# Patient Record
Sex: MALE | Race: WHITE | NOT HISPANIC OR LATINO | URBAN - METROPOLITAN AREA
[De-identification: names, ages, dates, MRNs, and addresses within clinical notes are randomized per-mention and may not be internally consistent; named-entity substitution may affect disease eponyms.]

---

## 2022-05-10 ENCOUNTER — EMERGENCY (EMERGENCY)
Facility: HOSPITAL | Age: 47
LOS: 0 days | Discharge: LEFT BEFORE DISCHARGE | End: 2022-05-10
Attending: EMERGENCY MEDICINE | Admitting: EMERGENCY MEDICINE
Payer: COMMERCIAL

## 2022-05-10 VITALS
WEIGHT: 175.05 LBS | TEMPERATURE: 99 F | RESPIRATION RATE: 16 BRPM | HEART RATE: 106 BPM | DIASTOLIC BLOOD PRESSURE: 99 MMHG | OXYGEN SATURATION: 100 % | SYSTOLIC BLOOD PRESSURE: 175 MMHG
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
## 2022-05-10 DIAGNOSIS — Z53.29 PROCEDURE AND TREATMENT NOT CARRIED OUT BECAUSE OF PATIENT'S DECISION FOR OTHER REASONS: ICD-10-CM

## 2022-05-10 DIAGNOSIS — M54.50 LOW BACK PAIN, UNSPECIFIED: ICD-10-CM

## 2022-05-10 DIAGNOSIS — F17.200 NICOTINE DEPENDENCE, UNSPECIFIED, UNCOMPLICATED: ICD-10-CM
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
## 2022-05-10 PROCEDURE — 99284 EMERGENCY DEPT VISIT MOD MDM: CPT

## 2022-05-10 RX ORDER — KETOROLAC TROMETHAMINE 30 MG/ML
30 SYRINGE (ML) INJECTION ONCE
Refills: 0 | Status: DISCONTINUED | OUTPATIENT
Start: 2022-05-10 | End: 2022-05-10

## 2022-05-10 RX ORDER — METHOCARBAMOL 500 MG/1
2 TABLET, FILM COATED ORAL
Qty: 18 | Refills: 0
Start: 2022-05-10 | End: 2022-05-12

## 2022-05-10 RX ORDER — LIDOCAINE 4 G/100G
1 CREAM TOPICAL
Qty: 3 | Refills: 0
Start: 2022-05-10 | End: 2022-05-12

## 2022-05-10 RX ADMIN — Medication 30 MILLIGRAM(S): at 18:21

## 2022-05-10 NOTE — ED PROVIDER NOTE - PHYSICAL EXAMINATION
Vital Signs: I have reviewed the initial vital signs.  Constitutional: well-nourished, appears stated age, no acute distress  Cardiovascular: regular rate, regular rhythm, well-perfused extremities  Respiratory: unlabored respiratory effort, clear to auscultation bilaterally  Gastrointestinal: soft, non-tender abdomen  Musculoskeletal: supple neck, no lower extremity edema, no midline or paraspinal cervical, thoracic, or lumbar tenderness; ambulating normally   Integumentary: warm, dry, no rash  Neurologic: awake, alert, extremities’ motor and sensory functions grossly intact  Psychiatric: appropriate mood, appropriate affect

## 2022-05-10 NOTE — ED PROVIDER NOTE - OBJECTIVE STATEMENT
The pt is a 46y M w/ no PMH presenting with lumbar back pain x4 days. Pt states on Friday he was getting dressed, and when he was bent pulling his pants up he felt sudden R lumbar back pain. In the proceeding days, pain persisted despite Advil and occasional Aleve. Today, he also feels pain in the L lumbar region. No fever, bladder/bowel complaints, LE numbness. Denies headache, chest pain, SOB, N/V, abdominal pain.

## 2025-05-19 ENCOUNTER — EMERGENCY (EMERGENCY)
Facility: HOSPITAL | Age: 50
LOS: 0 days | Discharge: HOME | End: 2025-05-19
Attending: EMERGENCY MEDICINE
Payer: COMMERCIAL

## 2025-05-19 VITALS
OXYGEN SATURATION: 99 % | DIASTOLIC BLOOD PRESSURE: 90 MMHG | RESPIRATION RATE: 16 BRPM | HEART RATE: 80 BPM | SYSTOLIC BLOOD PRESSURE: 162 MMHG

## 2025-05-19 VITALS
WEIGHT: 169.98 LBS | DIASTOLIC BLOOD PRESSURE: 98 MMHG | HEART RATE: 90 BPM | RESPIRATION RATE: 20 BRPM | SYSTOLIC BLOOD PRESSURE: 165 MMHG | TEMPERATURE: 99 F | HEIGHT: 70 IN | OXYGEN SATURATION: 99 %

## 2025-05-19 DIAGNOSIS — N20.2 CALCULUS OF KIDNEY WITH CALCULUS OF URETER: ICD-10-CM

## 2025-05-19 LAB
ALBUMIN SERPL ELPH-MCNC: 5 G/DL — SIGNIFICANT CHANGE UP (ref 3.5–5.2)
ALP SERPL-CCNC: 69 U/L — SIGNIFICANT CHANGE UP (ref 30–115)
ALT FLD-CCNC: 18 U/L — SIGNIFICANT CHANGE UP (ref 0–41)
ANION GAP SERPL CALC-SCNC: 15 MMOL/L — HIGH (ref 7–14)
AST SERPL-CCNC: 20 U/L — SIGNIFICANT CHANGE UP (ref 0–41)
BASOPHILS # BLD AUTO: 0.05 K/UL — SIGNIFICANT CHANGE UP (ref 0–0.2)
BASOPHILS NFR BLD AUTO: 1 % — SIGNIFICANT CHANGE UP (ref 0–1)
BILIRUB SERPL-MCNC: 0.3 MG/DL — SIGNIFICANT CHANGE UP (ref 0.2–1.2)
BUN SERPL-MCNC: 13 MG/DL — SIGNIFICANT CHANGE UP (ref 10–20)
CALCIUM SERPL-MCNC: 10 MG/DL — SIGNIFICANT CHANGE UP (ref 8.4–10.5)
CHLORIDE SERPL-SCNC: 103 MMOL/L — SIGNIFICANT CHANGE UP (ref 98–110)
CO2 SERPL-SCNC: 26 MMOL/L — SIGNIFICANT CHANGE UP (ref 17–32)
CREAT SERPL-MCNC: 0.9 MG/DL — SIGNIFICANT CHANGE UP (ref 0.7–1.5)
EGFR: 106 ML/MIN/1.73M2 — SIGNIFICANT CHANGE UP
EGFR: 106 ML/MIN/1.73M2 — SIGNIFICANT CHANGE UP
EOSINOPHIL # BLD AUTO: 0.24 K/UL — SIGNIFICANT CHANGE UP (ref 0–0.7)
EOSINOPHIL NFR BLD AUTO: 5 % — SIGNIFICANT CHANGE UP (ref 0–8)
GLUCOSE SERPL-MCNC: 103 MG/DL — HIGH (ref 70–99)
HCT VFR BLD CALC: 41.4 % — LOW (ref 42–52)
HGB BLD-MCNC: 14.5 G/DL — SIGNIFICANT CHANGE UP (ref 14–18)
IMM GRANULOCYTES NFR BLD AUTO: 0.2 % — SIGNIFICANT CHANGE UP (ref 0.1–0.3)
LIDOCAIN IGE QN: 26 U/L — SIGNIFICANT CHANGE UP (ref 7–60)
LYMPHOCYTES # BLD AUTO: 1.38 K/UL — SIGNIFICANT CHANGE UP (ref 1.2–3.4)
LYMPHOCYTES # BLD AUTO: 28.8 % — SIGNIFICANT CHANGE UP (ref 20.5–51.1)
MCHC RBC-ENTMCNC: 33.7 PG — HIGH (ref 27–31)
MCHC RBC-ENTMCNC: 35 G/DL — SIGNIFICANT CHANGE UP (ref 32–37)
MCV RBC AUTO: 96.3 FL — HIGH (ref 80–94)
MONOCYTES # BLD AUTO: 0.4 K/UL — SIGNIFICANT CHANGE UP (ref 0.1–0.6)
MONOCYTES NFR BLD AUTO: 8.4 % — SIGNIFICANT CHANGE UP (ref 1.7–9.3)
NEUTROPHILS # BLD AUTO: 2.71 K/UL — SIGNIFICANT CHANGE UP (ref 1.4–6.5)
NEUTROPHILS NFR BLD AUTO: 56.6 % — SIGNIFICANT CHANGE UP (ref 42.2–75.2)
NRBC BLD AUTO-RTO: 0 /100 WBCS — SIGNIFICANT CHANGE UP (ref 0–0)
PLATELET # BLD AUTO: 213 K/UL — SIGNIFICANT CHANGE UP (ref 130–400)
PMV BLD: 9.4 FL — SIGNIFICANT CHANGE UP (ref 7.4–10.4)
POTASSIUM SERPL-MCNC: 4.3 MMOL/L — SIGNIFICANT CHANGE UP (ref 3.5–5)
POTASSIUM SERPL-SCNC: 4.3 MMOL/L — SIGNIFICANT CHANGE UP (ref 3.5–5)
PROT SERPL-MCNC: 7.7 G/DL — SIGNIFICANT CHANGE UP (ref 6–8)
RBC # BLD: 4.3 M/UL — LOW (ref 4.7–6.1)
RBC # FLD: 11.8 % — SIGNIFICANT CHANGE UP (ref 11.5–14.5)
SODIUM SERPL-SCNC: 144 MMOL/L — SIGNIFICANT CHANGE UP (ref 135–146)
WBC # BLD: 4.79 K/UL — LOW (ref 4.8–10.8)
WBC # FLD AUTO: 4.79 K/UL — LOW (ref 4.8–10.8)

## 2025-05-19 PROCEDURE — 74177 CT ABD & PELVIS W/CONTRAST: CPT

## 2025-05-19 PROCEDURE — 36415 COLL VENOUS BLD VENIPUNCTURE: CPT

## 2025-05-19 PROCEDURE — 80053 COMPREHEN METABOLIC PANEL: CPT

## 2025-05-19 PROCEDURE — 96375 TX/PRO/DX INJ NEW DRUG ADDON: CPT

## 2025-05-19 PROCEDURE — 74177 CT ABD & PELVIS W/CONTRAST: CPT | Mod: 26

## 2025-05-19 PROCEDURE — 83690 ASSAY OF LIPASE: CPT

## 2025-05-19 PROCEDURE — 96374 THER/PROPH/DIAG INJ IV PUSH: CPT | Mod: XU

## 2025-05-19 PROCEDURE — 99285 EMERGENCY DEPT VISIT HI MDM: CPT

## 2025-05-19 PROCEDURE — 96376 TX/PRO/DX INJ SAME DRUG ADON: CPT

## 2025-05-19 PROCEDURE — 99284 EMERGENCY DEPT VISIT MOD MDM: CPT | Mod: 25

## 2025-05-19 PROCEDURE — 85025 COMPLETE CBC W/AUTO DIFF WBC: CPT

## 2025-05-19 RX ORDER — KETOROLAC TROMETHAMINE 30 MG/ML
15 INJECTION, SOLUTION INTRAMUSCULAR; INTRAVENOUS ONCE
Refills: 0 | Status: DISCONTINUED | OUTPATIENT
Start: 2025-05-19 | End: 2025-05-19

## 2025-05-19 RX ORDER — OXYCODONE HYDROCHLORIDE AND ACETAMINOPHEN 10; 325 MG/1; MG/1
1 TABLET ORAL
Qty: 12 | Refills: 0
Start: 2025-05-19

## 2025-05-19 RX ORDER — NAPROXEN SODIUM 275 MG
1 TABLET ORAL
Qty: 14 | Refills: 0
Start: 2025-05-19

## 2025-05-19 RX ORDER — ONDANSETRON HCL/PF 4 MG/2 ML
4 VIAL (ML) INJECTION ONCE
Refills: 0 | Status: COMPLETED | OUTPATIENT
Start: 2025-05-19 | End: 2025-05-19

## 2025-05-19 RX ORDER — TAMSULOSIN HYDROCHLORIDE 0.4 MG/1
1 CAPSULE ORAL
Qty: 5 | Refills: 0
Start: 2025-05-19 | End: 2025-05-23

## 2025-05-19 RX ADMIN — Medication 1000 MILLILITER(S): at 09:37

## 2025-05-19 RX ADMIN — Medication 4 MILLIGRAM(S): at 09:36

## 2025-05-19 RX ADMIN — KETOROLAC TROMETHAMINE 15 MILLIGRAM(S): 30 INJECTION, SOLUTION INTRAMUSCULAR; INTRAVENOUS at 09:35

## 2025-05-19 RX ADMIN — Medication 4 MILLIGRAM(S): at 10:07

## 2025-05-19 RX ADMIN — KETOROLAC TROMETHAMINE 15 MILLIGRAM(S): 30 INJECTION, SOLUTION INTRAMUSCULAR; INTRAVENOUS at 12:38

## 2025-05-19 NOTE — ED PROVIDER NOTE - OBJECTIVE STATEMENT
49-year-old male no significant past medical history presents today for abdominal pain for the last 3 days.  Patient is been having right-sided pain history of kidney stones history of hematuria no fevers chills

## 2025-05-19 NOTE — ED PROVIDER NOTE - PATIENT PORTAL LINK FT
You can access the FollowMyHealth Patient Portal offered by [facility] by registering at the following website: [email or web address]. By joining [facility]’s FollowMyHealth portal, you will also be able to view your health information using other applications (apps) compatible with our system.

## 2025-05-19 NOTE — ED PROVIDER NOTE - ATTENDING CONTRIBUTION TO CARE
Patient has a history of a previous kidney stone.  He complains of right flank pain.  He denies fever, trauma, rash.  He denies dysuria, frequency, urgency.  He denies hematuria.  Vital signs noted.  Patient in distress.  Chest clear.  Heart regular rate no murmur.  Abdomen nontender.  No CVA tenderness.  Extremities equal pulses.  Patient required morphine 2 control his pain.    Diagnostic testing reviewed.  WBCs within normal limits.  Chemistry shows no metabolic derangement.  CT confirms mid ureteral stone.  This is the likely etiology of the patient's symptoms.  Patient provided with analgesia including Toradol.  Patient asymptomatic after ED treatment.  In my opinion, outpatient follow-up and treatment are medically appropriate.  Analgesia provided at discharge.  Strict return precautions discussed.

## 2025-05-19 NOTE — ED PROVIDER NOTE - NSFOLLOWUPINSTRUCTIONS_ED_ALL_ED_FT
Our Emergency Department Referral Coordinators will be reaching out to you in the next 24-48 hours from 9:00am to 5:00pm to schedule a follow up appointment. Please expect a phone call from the hospital in that time frame. If you do not receive a call or if you have any questions or concerns, you can reach them at   [phone].      Kidney Stones    WHAT YOU NEED TO KNOW:    What is a kidney stone? Kidney stones form in the urinary system when the water and waste in your urine are out of balance. When this happens, certain types of waste crystals separate from the urine. The crystals build up and form kidney stones. Kidney stones can be made of uric acid, calcium, phosphate, or oxalate crystals. You may have more than one kidney stone.  Kidney Stones     What increases my risk for kidney stones?    Not drinking enough liquids (especially water) each day    Having urinary tract infections often    Too much of certain foods, such as meat, salt, nuts, and chocolate    Obesity    Certain medicines, such as diuretics, steroids, and antacids    A family history of kidney stones    Being born with a kidney or bowel disorder  What are the signs and symptoms of kidney stones?    Pain in the middle of your back that moves across to your side or that may spread to your groin    Nausea and vomiting    Urge to urinate often, burning feeling when you urinate, or pink or red urine    Tenderness in your lower back, side, or stomach  How are kidney stones diagnosed? Your healthcare provider will ask about your health and usual foods. He or she may refer you to a urologist. You may need tests to find out what type of kidney stones you have. Tests can show the size of your kidney stones and where they are in your urinary system. You may need more than one of the following:    Urine tests may show if you have blood in your urine. They may also show high amounts of the substances that form kidney stones, such as uric acid.    Blood tests show how well your kidneys are working. They may also be used to check the levels of calcium or uric acid in your blood.    X-ray or ultrasound pictures may be taken of your kidneys, bladder, and ureters. You may be given contrast liquid before an x-ray to help these show up better in the pictures. You may need to have more than one x-ray. Tell the healthcare provider if you have ever had an allergic reaction to contrast liquid.  How are kidney stones treated?    Medicines may be used to prevent or relieve pain or to balance your electrolytes.    A procedure or surgery to remove the kidney stones may be needed if they do not pass on their own. Your treatment will depend on the size and location of your kidney stones.  What can I do to manage kidney stones?    Drink more liquids. Your healthcare provider may tell you to drink at least 8 to 12 (eight-ounce) cups of liquids each day. This helps flush out the kidney stones when you urinate. Water is the best liquid to drink.    Strain your urine every time you go to the bathroom. Urinate through a strainer or a piece of thin cloth to catch the stones. Take the stones to your healthcare provider so they can be sent to the lab for tests. This will help your healthcare providers plan the best treatment for you.  Look for Stones in the Filter      Ask if you should avoid any foods. You may need to limit oxalate. Oxalate is a chemical found in some plant foods. The most common type of kidney stone is made up of crystals that contain calcium and oxalate. Your healthcare provider or dietitian may recommend that you limit oxalate if you get this type of kidney stone often. You may need to limit how much sodium (salt) or protein you eat. Ask for information about the best foods for you.  High Oxalate Foods      Be physically active as directed. Your stones may pass more easily if you stay active. Physical activity can also help you manage your weight. Ask about the best activities for you.   Family Walking for Exercise  When should I seek immediate care?    You are vomiting and it is not relieved with medicine.    When should I call my doctor?    You have a fever.    You have trouble urinating.    You see blood in your urine.    You have severe pain.    You have any questions or concerns about your condition or care.  CARE AGREEMENT:    You have the right to help plan your care. Learn about your health condition and how it may be treated. Discuss treatment options with your healthcare providers to decide what care you want to receive. You always have the right to refuse treatment.

## 2025-05-19 NOTE — ED PROVIDER NOTE - DIFFERENTIAL DIAGNOSIS
Differential Diagnosis Renal colic, musculoskeletal back pain, diverticulitis, colitis, vascular emergencies such as AAA.

## 2025-05-19 NOTE — ED PROVIDER NOTE - PHYSICAL EXAMINATION
CONSTITUTIONAL: Well-developed; Uncomfrotable apearing   SKIN: warm, dry  HEAD: Normocephalic; atraumatic.  EYES: PERRL, EOMI, no conjunctival erythema  ENT: No nasal discharge; airway clear.  NECK: Supple; non tender.  CARD:  Regular rate and rhythm.   RESP: No wheezes, rales or rhonchi.  ABD: soft ntnd  EXT: Normal ROM.  No clubbing, cyanosis or edema.   Back: right flank pain

## 2025-05-21 NOTE — CHART NOTE - NSCHARTNOTEFT_GEN_A_CORE
[facility] N [id number] / Left message 5/20 & 5/21 - GUY    SPECIALTY: urology

## 2025-06-11 ENCOUNTER — INPATIENT (INPATIENT)
Facility: HOSPITAL | Age: 50
LOS: 1 days | Discharge: HOME | DRG: 379 | End: 2025-06-13
Attending: STUDENT IN AN ORGANIZED HEALTH CARE EDUCATION/TRAINING PROGRAM | Admitting: FAMILY MEDICINE
Payer: COMMERCIAL

## 2025-06-11 VITALS
RESPIRATION RATE: 18 BRPM | OXYGEN SATURATION: 99 % | DIASTOLIC BLOOD PRESSURE: 83 MMHG | HEART RATE: 116 BPM | TEMPERATURE: 98 F | HEIGHT: 70 IN | SYSTOLIC BLOOD PRESSURE: 152 MMHG | WEIGHT: 160.06 LBS

## 2025-06-11 LAB
BASOPHILS # BLD AUTO: 0.04 K/UL — SIGNIFICANT CHANGE UP (ref 0–0.2)
BASOPHILS NFR BLD AUTO: 0.5 % — SIGNIFICANT CHANGE UP (ref 0–1)
EOSINOPHIL # BLD AUTO: 0.29 K/UL — SIGNIFICANT CHANGE UP (ref 0–0.7)
EOSINOPHIL NFR BLD AUTO: 3.9 % — SIGNIFICANT CHANGE UP (ref 0–8)
HCT VFR BLD CALC: 29 % — LOW (ref 42–52)
HGB BLD-MCNC: 9.9 G/DL — LOW (ref 14–18)
IMM GRANULOCYTES NFR BLD AUTO: 0.3 % — SIGNIFICANT CHANGE UP (ref 0.1–0.3)
LYMPHOCYTES # BLD AUTO: 1.88 K/UL — SIGNIFICANT CHANGE UP (ref 1.2–3.4)
LYMPHOCYTES # BLD AUTO: 25.2 % — SIGNIFICANT CHANGE UP (ref 20.5–51.1)
MCHC RBC-ENTMCNC: 33.4 PG — HIGH (ref 27–31)
MCHC RBC-ENTMCNC: 34.1 G/DL — SIGNIFICANT CHANGE UP (ref 32–37)
MCV RBC AUTO: 98 FL — HIGH (ref 80–94)
MONOCYTES # BLD AUTO: 0.55 K/UL — SIGNIFICANT CHANGE UP (ref 0.1–0.6)
MONOCYTES NFR BLD AUTO: 7.4 % — SIGNIFICANT CHANGE UP (ref 1.7–9.3)
NEUTROPHILS # BLD AUTO: 4.68 K/UL — SIGNIFICANT CHANGE UP (ref 1.4–6.5)
NEUTROPHILS NFR BLD AUTO: 62.7 % — SIGNIFICANT CHANGE UP (ref 42.2–75.2)
NRBC BLD AUTO-RTO: 0 /100 WBCS — SIGNIFICANT CHANGE UP (ref 0–0)
PLATELET # BLD AUTO: 238 K/UL — SIGNIFICANT CHANGE UP (ref 130–400)
PMV BLD: 8.9 FL — SIGNIFICANT CHANGE UP (ref 7.4–10.4)
RBC # BLD: 2.96 M/UL — LOW (ref 4.7–6.1)
RBC # FLD: 11.8 % — SIGNIFICANT CHANGE UP (ref 11.5–14.5)
WBC # BLD: 7.46 K/UL — SIGNIFICANT CHANGE UP (ref 4.8–10.8)
WBC # FLD AUTO: 7.46 K/UL — SIGNIFICANT CHANGE UP (ref 4.8–10.8)

## 2025-06-11 PROCEDURE — 99285 EMERGENCY DEPT VISIT HI MDM: CPT

## 2025-06-11 RX ORDER — IOHEXOL 350 MG/ML
30 INJECTION, SOLUTION INTRAVENOUS ONCE
Refills: 0 | Status: DISCONTINUED | OUTPATIENT
Start: 2025-06-11 | End: 2025-06-11

## 2025-06-11 RX ADMIN — Medication 1000 MILLILITER(S): at 23:53

## 2025-06-11 RX ADMIN — Medication 20 MILLIGRAM(S): at 23:52

## 2025-06-12 DIAGNOSIS — K92.2 GASTROINTESTINAL HEMORRHAGE, UNSPECIFIED: ICD-10-CM

## 2025-06-12 LAB
ALBUMIN SERPL ELPH-MCNC: 4.2 G/DL — SIGNIFICANT CHANGE UP (ref 3.5–5.2)
ALP SERPL-CCNC: 80 U/L — SIGNIFICANT CHANGE UP (ref 30–115)
ALT FLD-CCNC: 11 U/L — SIGNIFICANT CHANGE UP (ref 0–41)
ANION GAP SERPL CALC-SCNC: 10 MMOL/L — SIGNIFICANT CHANGE UP (ref 7–14)
ANION GAP SERPL CALC-SCNC: 14 MMOL/L — SIGNIFICANT CHANGE UP (ref 7–14)
APPEARANCE UR: CLEAR — SIGNIFICANT CHANGE UP
AST SERPL-CCNC: 16 U/L — SIGNIFICANT CHANGE UP (ref 0–41)
BILIRUB SERPL-MCNC: <0.2 MG/DL — SIGNIFICANT CHANGE UP (ref 0.2–1.2)
BILIRUB UR-MCNC: NEGATIVE — SIGNIFICANT CHANGE UP
BLD GP AB SCN SERPL QL: SIGNIFICANT CHANGE UP
BUN SERPL-MCNC: 12 MG/DL — SIGNIFICANT CHANGE UP (ref 10–20)
BUN SERPL-MCNC: 18 MG/DL — SIGNIFICANT CHANGE UP (ref 10–20)
CALCIUM SERPL-MCNC: 8.8 MG/DL — SIGNIFICANT CHANGE UP (ref 8.4–10.5)
CALCIUM SERPL-MCNC: 9 MG/DL — SIGNIFICANT CHANGE UP (ref 8.4–10.5)
CHLORIDE SERPL-SCNC: 102 MMOL/L — SIGNIFICANT CHANGE UP (ref 98–110)
CHLORIDE SERPL-SCNC: 102 MMOL/L — SIGNIFICANT CHANGE UP (ref 98–110)
CO2 SERPL-SCNC: 22 MMOL/L — SIGNIFICANT CHANGE UP (ref 17–32)
CO2 SERPL-SCNC: 26 MMOL/L — SIGNIFICANT CHANGE UP (ref 17–32)
COLOR SPEC: YELLOW — SIGNIFICANT CHANGE UP
CREAT SERPL-MCNC: 0.8 MG/DL — SIGNIFICANT CHANGE UP (ref 0.7–1.5)
CREAT SERPL-MCNC: 0.9 MG/DL — SIGNIFICANT CHANGE UP (ref 0.7–1.5)
DIFF PNL FLD: NEGATIVE — SIGNIFICANT CHANGE UP
EGFR: 106 ML/MIN/1.73M2 — SIGNIFICANT CHANGE UP
EGFR: 106 ML/MIN/1.73M2 — SIGNIFICANT CHANGE UP
EGFR: 110 ML/MIN/1.73M2 — SIGNIFICANT CHANGE UP
EGFR: 110 ML/MIN/1.73M2 — SIGNIFICANT CHANGE UP
GLUCOSE SERPL-MCNC: 85 MG/DL — SIGNIFICANT CHANGE UP (ref 70–99)
GLUCOSE SERPL-MCNC: 98 MG/DL — SIGNIFICANT CHANGE UP (ref 70–99)
GLUCOSE UR QL: NEGATIVE MG/DL — SIGNIFICANT CHANGE UP
HCT VFR BLD CALC: 25.9 % — LOW (ref 42–52)
HCT VFR BLD CALC: 26 % — LOW (ref 42–52)
HCT VFR BLD CALC: 26.9 % — LOW (ref 42–52)
HCT VFR BLD CALC: 27.5 % — LOW (ref 42–52)
HCT VFR BLD CALC: 27.6 % — LOW (ref 42–52)
HGB BLD-MCNC: 8.8 G/DL — LOW (ref 14–18)
HGB BLD-MCNC: 9 G/DL — LOW (ref 14–18)
HGB BLD-MCNC: 9.2 G/DL — LOW (ref 14–18)
HGB BLD-MCNC: 9.3 G/DL — LOW (ref 14–18)
HGB BLD-MCNC: 9.5 G/DL — LOW (ref 14–18)
KETONES UR QL: ABNORMAL MG/DL
LEUKOCYTE ESTERASE UR-ACNC: ABNORMAL
LIDOCAIN IGE QN: 32 U/L — SIGNIFICANT CHANGE UP (ref 7–60)
MCHC RBC-ENTMCNC: 33.3 PG — HIGH (ref 27–31)
MCHC RBC-ENTMCNC: 33.3 PG — HIGH (ref 27–31)
MCHC RBC-ENTMCNC: 33.7 PG — HIGH (ref 27–31)
MCHC RBC-ENTMCNC: 33.8 G/DL — SIGNIFICANT CHANGE UP (ref 32–37)
MCHC RBC-ENTMCNC: 33.8 G/DL — SIGNIFICANT CHANGE UP (ref 32–37)
MCHC RBC-ENTMCNC: 33.8 PG — HIGH (ref 27–31)
MCHC RBC-ENTMCNC: 34 PG — HIGH (ref 27–31)
MCHC RBC-ENTMCNC: 34.2 G/DL — SIGNIFICANT CHANGE UP (ref 32–37)
MCHC RBC-ENTMCNC: 34.4 G/DL — SIGNIFICANT CHANGE UP (ref 32–37)
MCHC RBC-ENTMCNC: 34.7 G/DL — SIGNIFICANT CHANGE UP (ref 32–37)
MCV RBC AUTO: 97.7 FL — HIGH (ref 80–94)
MCV RBC AUTO: 98.2 FL — HIGH (ref 80–94)
MCV RBC AUTO: 98.5 FL — HIGH (ref 80–94)
MCV RBC AUTO: 98.5 FL — HIGH (ref 80–94)
MCV RBC AUTO: 98.6 FL — HIGH (ref 80–94)
NITRITE UR-MCNC: NEGATIVE — SIGNIFICANT CHANGE UP
NRBC BLD AUTO-RTO: 0 /100 WBCS — SIGNIFICANT CHANGE UP (ref 0–0)
PH UR: 6.5 — SIGNIFICANT CHANGE UP (ref 5–8)
PLATELET # BLD AUTO: 195 K/UL — SIGNIFICANT CHANGE UP (ref 130–400)
PLATELET # BLD AUTO: 205 K/UL — SIGNIFICANT CHANGE UP (ref 130–400)
PLATELET # BLD AUTO: 209 K/UL — SIGNIFICANT CHANGE UP (ref 130–400)
PLATELET # BLD AUTO: 210 K/UL — SIGNIFICANT CHANGE UP (ref 130–400)
PLATELET # BLD AUTO: 236 K/UL — SIGNIFICANT CHANGE UP (ref 130–400)
PMV BLD: 8.9 FL — SIGNIFICANT CHANGE UP (ref 7.4–10.4)
PMV BLD: 9.2 FL — SIGNIFICANT CHANGE UP (ref 7.4–10.4)
PMV BLD: 9.2 FL — SIGNIFICANT CHANGE UP (ref 7.4–10.4)
PMV BLD: 9.4 FL — SIGNIFICANT CHANGE UP (ref 7.4–10.4)
PMV BLD: 9.5 FL — SIGNIFICANT CHANGE UP (ref 7.4–10.4)
POTASSIUM SERPL-MCNC: 3.9 MMOL/L — SIGNIFICANT CHANGE UP (ref 3.5–5)
POTASSIUM SERPL-MCNC: 4.2 MMOL/L — SIGNIFICANT CHANGE UP (ref 3.5–5)
POTASSIUM SERPL-SCNC: 3.9 MMOL/L — SIGNIFICANT CHANGE UP (ref 3.5–5)
POTASSIUM SERPL-SCNC: 4.2 MMOL/L — SIGNIFICANT CHANGE UP (ref 3.5–5)
PROT SERPL-MCNC: 6.7 G/DL — SIGNIFICANT CHANGE UP (ref 6–8)
PROT UR-MCNC: NEGATIVE MG/DL — SIGNIFICANT CHANGE UP
RBC # BLD: 2.64 M/UL — LOW (ref 4.7–6.1)
RBC # BLD: 2.65 M/UL — LOW (ref 4.7–6.1)
RBC # BLD: 2.73 M/UL — LOW (ref 4.7–6.1)
RBC # BLD: 2.79 M/UL — LOW (ref 4.7–6.1)
RBC # BLD: 2.81 M/UL — LOW (ref 4.7–6.1)
RBC # FLD: 11.7 % — SIGNIFICANT CHANGE UP (ref 11.5–14.5)
RBC # FLD: 11.8 % — SIGNIFICANT CHANGE UP (ref 11.5–14.5)
RBC # FLD: 11.8 % — SIGNIFICANT CHANGE UP (ref 11.5–14.5)
RBC # FLD: 11.9 % — SIGNIFICANT CHANGE UP (ref 11.5–14.5)
RBC # FLD: 11.9 % — SIGNIFICANT CHANGE UP (ref 11.5–14.5)
SODIUM SERPL-SCNC: 138 MMOL/L — SIGNIFICANT CHANGE UP (ref 135–146)
SODIUM SERPL-SCNC: 138 MMOL/L — SIGNIFICANT CHANGE UP (ref 135–146)
SP GR SPEC: 1.01 — SIGNIFICANT CHANGE UP (ref 1–1.03)
UROBILINOGEN FLD QL: 0.2 MG/DL — SIGNIFICANT CHANGE UP (ref 0.2–1)
WBC # BLD: 6.12 K/UL — SIGNIFICANT CHANGE UP (ref 4.8–10.8)
WBC # BLD: 6.14 K/UL — SIGNIFICANT CHANGE UP (ref 4.8–10.8)
WBC # BLD: 6.45 K/UL — SIGNIFICANT CHANGE UP (ref 4.8–10.8)
WBC # BLD: 6.64 K/UL — SIGNIFICANT CHANGE UP (ref 4.8–10.8)
WBC # BLD: 6.7 K/UL — SIGNIFICANT CHANGE UP (ref 4.8–10.8)
WBC # FLD AUTO: 6.12 K/UL — SIGNIFICANT CHANGE UP (ref 4.8–10.8)
WBC # FLD AUTO: 6.14 K/UL — SIGNIFICANT CHANGE UP (ref 4.8–10.8)
WBC # FLD AUTO: 6.45 K/UL — SIGNIFICANT CHANGE UP (ref 4.8–10.8)
WBC # FLD AUTO: 6.64 K/UL — SIGNIFICANT CHANGE UP (ref 4.8–10.8)
WBC # FLD AUTO: 6.7 K/UL — SIGNIFICANT CHANGE UP (ref 4.8–10.8)

## 2025-06-12 PROCEDURE — 99223 1ST HOSP IP/OBS HIGH 75: CPT

## 2025-06-12 PROCEDURE — 88312 SPECIAL STAINS GROUP 1: CPT

## 2025-06-12 PROCEDURE — 99222 1ST HOSP IP/OBS MODERATE 55: CPT

## 2025-06-12 PROCEDURE — 74174 CTA ABD&PLVS W/CONTRAST: CPT | Mod: 26

## 2025-06-12 PROCEDURE — 80048 BASIC METABOLIC PNL TOTAL CA: CPT

## 2025-06-12 PROCEDURE — 85027 COMPLETE CBC AUTOMATED: CPT

## 2025-06-12 PROCEDURE — 81001 URINALYSIS AUTO W/SCOPE: CPT

## 2025-06-12 PROCEDURE — 88305 TISSUE EXAM BY PATHOLOGIST: CPT

## 2025-06-12 PROCEDURE — 36415 COLL VENOUS BLD VENIPUNCTURE: CPT

## 2025-06-12 RX ORDER — BISACODYL 5 MG
10 TABLET, DELAYED RELEASE (ENTERIC COATED) ORAL ONCE
Refills: 0 | Status: COMPLETED | OUTPATIENT
Start: 2025-06-12 | End: 2025-06-12

## 2025-06-12 RX ORDER — POLYETHYLENE GLYCOL-3350 AND ELECTROLYTES 236; 6.74; 5.86; 2.97; 22.74 G/274.31G; G/274.31G; G/274.31G; G/274.31G; G/274.31G
4000 POWDER, FOR SOLUTION ORAL ONCE
Refills: 0 | Status: COMPLETED | OUTPATIENT
Start: 2025-06-12 | End: 2025-06-12

## 2025-06-12 RX ORDER — ACETAMINOPHEN 500 MG/5ML
650 LIQUID (ML) ORAL EVERY 6 HOURS
Refills: 0 | Status: DISCONTINUED | OUTPATIENT
Start: 2025-06-12 | End: 2025-06-13

## 2025-06-12 RX ORDER — MAGNESIUM, ALUMINUM HYDROXIDE 200-200 MG
30 TABLET,CHEWABLE ORAL EVERY 4 HOURS
Refills: 0 | Status: DISCONTINUED | OUTPATIENT
Start: 2025-06-12 | End: 2025-06-13

## 2025-06-12 RX ORDER — ONDANSETRON HCL/PF 4 MG/2 ML
4 VIAL (ML) INJECTION EVERY 8 HOURS
Refills: 0 | Status: DISCONTINUED | OUTPATIENT
Start: 2025-06-12 | End: 2025-06-13

## 2025-06-12 RX ADMIN — Medication 40 MILLIGRAM(S): at 03:07

## 2025-06-12 RX ADMIN — POLYETHYLENE GLYCOL-3350 AND ELECTROLYTES 4000 MILLILITER(S): 236; 6.74; 5.86; 2.97; 22.74 POWDER, FOR SOLUTION ORAL at 19:01

## 2025-06-12 RX ADMIN — Medication 80 MILLILITER(S): at 20:05

## 2025-06-12 RX ADMIN — Medication 40 MILLIGRAM(S): at 18:59

## 2025-06-12 RX ADMIN — Medication 10 MILLIGRAM(S): at 19:00

## 2025-06-12 RX ADMIN — Medication 80 MILLILITER(S): at 11:41

## 2025-06-12 RX ADMIN — Medication 1 APPLICATION(S): at 11:44

## 2025-06-12 NOTE — PATIENT PROFILE ADULT - FALL HARM RISK - HARM RISK INTERVENTIONS

## 2025-06-12 NOTE — CONSULT NOTE ADULT - ASSESSMENT
This is a 49-year-old M with a PMHx of Diverticulosis, hx colon resection 2016, recently diagnosed right ureteral stone,  presenting with blood per rectum and abdominal pain    1. R/O GI bleed  2. Distal right ureteral stone w/minimal hydroureter and asymtomatic    Case DW Dr. Carrera:   - no acute  intervention  -continue to strain urine  - can start flomax  -trend labs, creatinine  -Please alert  service immediately if patient develops fever, intractable pain, change in vital signs - would need emergent stent   -please obtain clean catch UA (RN aware)  -will follow  Per GI: pt foe EGD/colonoscopy this admission

## 2025-06-12 NOTE — CONSULT NOTE ADULT - SUBJECTIVE AND OBJECTIVE BOX
This is a 49-year-old M with a PMHx of diverticulosis w/hx of colon resection in 2016, who presents with complaints of rectal bleeding and abdominal bloating/cramping x 3 days. He also states that three weeks ago, he visited the ER for right flank pain, and was diagnosed with a right mid ureteral stone --DC'd from ER at that time.  Patient followed-up with Dr. Torrez ([facility])  --  and referral to Urology (Dr. Norwood was scheduled for (06/18).   -- x2 nights ago, the patient reports to tenlonniemus followed by bright red blood per rectum. The patient is unsure if the blood is from the stool or urine.   -- x1 night ago, the patient mentioned developing abdominal cramping, blood per rectum, diaphoresis, dizziness, hematuria, and fever. He had the urge to urinate but was unsure if he had actually gone. Patient reports his last BM to be x1 day ago.   -- Today, per the patient, currently passing flatulence and small amounts of bright red blood with wiping.  He also denies right flank pain/current hematuria/dysuria; admits to occasional pain radiating to right scrotum (but none today);   Patient admits to smoking 1-2 cigarettes QD and drinks 10-12oz of wine/ day. Patient currently takes a multivitamin QD. NKDA. Patient denies currently, fever, hx of bleeding/ clotting d/o, rashes, exposure to sick contacts, or N/V.        ROS:  - GENERAL: + fever.  + diaphoresis. Denies weight loss  - CARDIO: denies chest pain, palpitations, edema.  - PULM: denies SOB, cough, wheeze  - GI: +abd cramping, + bright red blood per rectum. Denies appetite changes, N/V/  - : +hematuria, +flatulence, + bloating. Denies dysuria, frequency  - NEURO: + dizziness. Denies syncope, weakness, numbness or tingling.     PAST MEDICAL HX:  - Diverticulosis  - Kidney Stones    PAST SURGICAL HX:  - colon resection 2016    MEDICATIONS:  - Multivitamin QD    ALLERGIES:  - NKDA    FAMILY HX:  - non-contributory    SOCIAL HX:  - Current smoker: 1-2 cigarettes/ day  - No drug use  - Alcohol: wine, 10-12oz/ day  -    OBJECTIVE:    Vital Signs Last 24 Hrs  T(C): 36.8 (12 Jun 2025 05:01), Max: 36.8 (12 Jun 2025 03:52)  T(F): 98.2 (12 Jun 2025 05:01), Max: 98.2 (12 Jun 2025 03:52)  HR: 65 (12 Jun 2025 05:01) (65 - 116)  BP: 148/82 (12 Jun 2025 05:01) (147/96 - 152/83)  RR: 18 (12 Jun 2025 05:01) (18 - 18)  SpO2: 99% (12 Jun 2025 05:01) (99% - 100%)  --------------  Parameters below as of 12 Jun 2025 05:01  Patient On (Oxygen Delivery Method): room air      PHYSICAL EXAM:  - GENERAL: alert, awake, and oriented x3  - CARDIOVASCULAR: regular rate and rhythm. No murmurs, rubs, or gallops.  - RESPIRATORY: clear to auscultation bilaterally. No rales, wheezes, or rhonchi.   - GASTROINTESTINAL: soft, NT/ND  - GENITOURINARY: No CVA tenderness B/L. No suprapubic tenderness.    LABS:                      9.0    6.45  )-----------( 195      ( 12 Jun 2025 07:16 )             25.9     06-11    138  |  102  |  18  ----------------------------<  98  3.9   |  22  |  0.8    Ca    9.0      11 Jun 2025 23:50    TPro  6.7  /  Alb  4.2  /  TBili  <0.2  /  DBili  x   /  AST  16  /  ALT  11  /  AlkPhos  80  06-11      IMAGING  CT Angio Abdomen and Pelvis w/ IV Cont (06.12.25 @ 01:26) >  IMPRESSION:    No CTA evidence of acute GI bleed.    Calculi in the distal right ureter, largest measures 3 x 3 by 4 mm   (Hounsfield unit 1562). Minimal hydroureter.    Nonobstructing left lower pole renal calculus.

## 2025-06-12 NOTE — H&P ADULT - HISTORY OF PRESENT ILLNESS
49 year old male past medical history of diverticulosis with colon resction, renal stones comes to emergency room for GI bleed. patient states that since yesterday he has had 2 episodes per day of BRBPR. patient states that he has no chest pain, no shortness of breath. no weakness. no abd pain. 50 y/o M w/PMHx of diverticulosis with colon resection, R renal stones comes to emergency room for GI bleed. patient states that since 6/10 he has had 2 episodes per day of BRBPR. Denies ever having GIB or any blood transfusions. States he had a fall and injured his L flank region while cleaning up his yard for daughters graduation party and thinks perhaps GIB may be related to the the trauma. patient states that he has no chest pain, no shortness of breath. no weakness. no abd pain. Denies HA, dizziness.  50 y/o M w/PMHx of diverticulosis with colon resection, R renal stones comes to emergency room for GI bleed. patient states that since 6/10 he has had 2 episodes per day of BRBPR. Denies ever having GIB or any blood transfusions. States he had a fall and injured his L flank region while cleaning up his yard for daughters graduation party and thinks perhaps GIB may be related to the the trauma. patient states that he has no chest pain, no shortness of breath. no weakness. no abd pain N/v/D . Denies HA, dizziness.

## 2025-06-12 NOTE — CONSULT NOTE ADULT - ASSESSMENT
49yMale pmh episodes of diverticulitis s/p sigmoid resection 2016 had Colonoscopy with Dr. Queen prior and showed diverticulosis never had Colonoscopy since, right renal stones taking N-saids the past few weeks for pain presents for two days of BRBPR. Patient denies nausea, vomiting, hematemesis, melena,  diarrhea, constipation, abdominal pain. +blood in stool    #BRBPR, N-said use  History of Diverticulosis and sigmoid resection  ddx PUD, gastritis, diverticulosis  Rec  -plan for EGD/Colonoscopy tomorrow  -prep today clear liquids, 4L golytely 20mg dulcolax  -npo aftermidnight  -stop N-SAIDS  -Maintain Hemodynamic Stability   -Monitor CBC  -CMP,Optimize Electrolytes  -PT,PTT,INR  -EKG, Chest-Xray   -Transfuse prn to hgb >8  -Two large bore IV lines  -PPI BID  -Monitor Vital Signs  -Monitor Stool For blood, frequency, consistency, melena  -Active Type and Screen  -Iron Studies, Folate, Vitamin B12 levels   - Tobacco and Alcohol Cessation Strongly Advised and Encouraged     #Calculi in the distal right ureter, largest measures 3 x 3 by 4 mm   (Hounsfield unit 1562). Minimal hydroureter.  #Nonobstructing left lower pole renal calculus  Rec  - Care as per primary team and urology team  49yMale pmh episodes of diverticulitis s/p sigmoid resection 2016 had Colonoscopy with Dr. Queen prior and showed diverticulosis never had Colonoscopy since, right renal stones taking N-saids the past few weeks for pain presents for two days of BRBPR. Patient denies nausea, vomiting, hematemesis, melena,  diarrhea, constipation, abdominal pain. +blood in stool    #BRBPR, N-said use  History of Diverticulosis and sigmoid resection  ddx PUD, gastritis, diverticulosis  Rec  -plan for EGD/Colonoscopy tomorrow  -prep today clear liquids, 4L golytely 20mg dulcolax  -npo aftermidnight  -stop N-SAIDS  -Maintain Hemodynamic Stability   -Monitor CBC  -CMP,Optimize Electrolytes  -PT,PTT,INR  -EKG, Chest-Xray   -Transfuse prn to hgb >8  -Two large bore IV lines  -PPI BID  -Monitor Vital Signs  -Monitor Stool For blood, frequency, consistency, melena  -Active Type and Screen  -Iron Studies, Folate, Vitamin B12 levels   - Tobacco and Alcohol Cessation Strongly Advised and Encouraged     #Calculi in the distal right ureter, largest measures 3 x 3 by 4 mm   (Hounsfield unit 1562). Minimal hydroureter.  #Nonobstructing left lower pole renal calculus  Rec  - Care as per primary team and urology team    #bruising on left side of abdomen noted  -patient had fall two weeks ago  Rec  -appreciate CT  -primary team aware  49yMale pmh episodes of diverticulitis s/p sigmoid resection 2016 had Colonoscopy with Dr. Queen prior and showed diverticulosis never had Colonoscopy since, right renal stones taking N-saids the past few weeks for pain presents for two days of BRBPR. Patient denies nausea, vomiting, hematemesis, melena,  diarrhea, constipation, abdominal pain. +blood in stool    #BRBPR, N-said use  History of Diverticulosis and sigmoid resection  ddx PUD, gastritis, diverticulosis  Rec  -plan for EGD/Colonoscopy tomorrow, risks abd benefits discussed  -prep today clear liquids, 4L golytely 20mg dulcolax  -npo aftermidnight  -stop N-SAIDS  -Maintain Hemodynamic Stability   -Monitor CBC  -CMP,Optimize Electrolytes  -PT,PTT,INR  -EKG, Chest-Xray   -Transfuse prn to hgb >8  -Two large bore IV lines  -PPI BID  -Monitor Vital Signs  -Monitor Stool For blood, frequency, consistency, melena  -Active Type and Screen  -Iron Studies, Folate, Vitamin B12 levels   - Tobacco and Alcohol Cessation Strongly Advised and Encouraged     #Calculi in the distal right ureter, largest measures 3 x 3 by 4 mm   (Hounsfield unit 1562). Minimal hydroureter.  #Nonobstructing left lower pole renal calculus  Rec  - Care as per primary team and urology team    #bruising on left side of abdomen noted  -patient had fall two weeks ago  Rec  -appreciate CT  -primary team aware

## 2025-06-12 NOTE — PATIENT PROFILE ADULT - PUBLIC BENEFITS
Anesthesia Type: 1% lidocaine with 1:100,000 epinephrine and 408mcg clindamycin/ml and a 1:10 solution of 8.4% sodium bicarbonate no

## 2025-06-12 NOTE — MEDICAL STUDENT PROGRESS NOTE(EDUCATION) - ASSESSMENT
This is a 49-year-old M with a PMHx of Diverticulosis and Kidney Stones presenting with blood per rectum and abdominal pain  Labs were reviewed and patient was noted to have a anemia. Hemoglobin levels dropped from 14.5 --9.9 --8.8 --9.  BUN: Cr ratio was within the normal limit.     CT A/P scan was reviewed and patient noted to have a mild R hydroureter.   The largest stone measuring about 3x3x4 in the distal R ureter  L ureter is unobstructed and functioning at baseline. This is a 49-year-old M with a PMHx of Diverticulosis, hx colon resection 2016, recently diagnosed right ureteral stone,  presenting with blood per rectum and abdominal pain      CT A/P scan was reviewed and patient noted to have a mild R hydroureter.   The largest stone measuring about 3x3x4 in the distal R ureter  L ureter is unobstructed and functioning at baseline. show

## 2025-06-12 NOTE — ED PROVIDER NOTE - CLINICAL SUMMARY MEDICAL DECISION MAKING FREE TEXT BOX
49-year-old male past medical history of diverticulosis status post partial colectomy, presents for 2 episodes last night of bright red blood per rectum and 2 episodes this morning.  When patient told his sister who is a nurse later tonight came into ED for eval.  Patient not had any more episodes since this morning.Nontoxic well-appearing rectal Mar[place] stool hemoglobin 9 (previous 14) CTA abdomen no active bleeding.  Given large drop in hemoglobin patient admitted for GI eval and serial CBCs

## 2025-06-12 NOTE — CONSULT NOTE ADULT - SUBJECTIVE AND OBJECTIVE BOX
Chief complaint/Reason for consult: BRBPR    HPI:  48 y/o M w/PMHx of diverticulosis with colon resection, R renal stones comes to emergency room for GI bleed. patient states that since 6/10 he has had 2 episodes per day of BRBPR. Denies ever having GIB or any blood transfusions. States he had a fall and injured his L flank region while cleaning up his yard for daughters graduation party and thinks perhaps GIB may be related to the the trauma. patient states that he has no chest pain, no shortness of breath. no weakness. no abd pain N/v/D . Denies HA, dizziness.  (12 Jun 2025 05:16)    GI updates: 49yMale pmh episodes of diverticulitis s/p sigmoid resection 2016 had Colonoscopy with Dr. Queen prior and showed diverticulosis never had Colonoscopy since, right renal stones taking N-saids the past few weeks for pain presents for two days of BRBPR. Patient denies nausea, vomiting, hematemesis, melena,  diarrhea, constipation, abdominal pain. +blood in stool      PAST MEDICAL & SURGICAL HISTORY:   Renal stones  Diverticulosis  History of colon resection  GIB (gastrointestinal bleeding)      Family history:  FAMILY HISTORY:    No GI cancers in first or second degree relatives    Social History: +2-3 cigarettes daily smoking. +daily wine alcohol. No illegal drug use.    Allergies:   No Known Allergies  Intolerances      MEDICATIONS  (STANDING):  chlorhexidine 2% Cloths 1 Application(s) Topical <User Schedule>  pantoprazole  Injectable 40 milliGRAM(s) IV Push every 12 hours  sodium chloride 0.9%. 1000 milliLiter(s) (80 mL/Hr) IV Continuous <Continuous>    MEDICATIONS  (PRN):  acetaminophen     Tablet .. 650 milliGRAM(s) Oral every 6 hours PRN Temp greater or equal to 38C (100.4F), Mild Pain (1 - 3)  aluminum hydroxide/magnesium hydroxide/simethicone Suspension 30 milliLiter(s) Oral every 4 hours PRN Dyspepsia  ondansetron Injectable 4 milliGRAM(s) IV Push every 8 hours PRN Nausea and/or Vomiting        REVIEW OF SYSTEMS  General:  No weight loss, fevers, or chills.  Eyes:  No reported pain or visual changes  ENT:  No sore throat or runny nose.  NECK: No stiffness   CV:  No chest pain or palpitations.  Resp:  No shortness of breath, cough, wheezing or hemoptysis  GI:  No abdominal pain, nausea, vomiting, dysphagia, diarrhea or constipation. + rectal bleeding, no melena, or hematemesis.  Neuro:  No tingling, numbness       VITALS:   T(F): 98.2 (06-12-25 @ 05:01), Max: 98.2 (06-12-25 @ 03:52)  HR: 65 (06-12-25 @ 05:01) (65 - 116)  BP: 148/82 (06-12-25 @ 05:01) (147/96 - 152/83)  RR: 18 (06-12-25 @ 05:01) (18 - 18)  SpO2: 99% (06-12-25 @ 05:01) (99% - 100%)    PHYSICAL EXAM:  GENERAL: AAOx3, no acute distress.  HEAD:  Atraumatic, Normocephalic  EYES: conjunctiva and sclera clear  NECK: Supple, No thyromegaly   CHEST/LUNG: Clear to auscultation bilaterally; No wheeze, rhonchi, or rales  HEART: Regular rate and rhythm; normal S1, S2, No murmurs.  ABDOMEN: Soft, nontender, nondistended; Bowel sounds present  NEUROLOGY: No asterixis or tremor  SKIN: Intact, no jaundice  Rectal exam-trace red blood in rectal vault        LABS:  06-11    138  |  102  |  18  ----------------------------<  98  3.9   |  22  |  0.8    Ca    9.0      11 Jun 2025 23:50    TPro  6.7  /  Alb  4.2  /  TBili  <0.2  /  DBili  x   /  AST  16  /  ALT  11  /  AlkPhos  80  06-11                          9.0    6.45  )-----------( 195      ( 12 Jun 2025 07:16 )             25.9     LIVER FUNCTIONS - ( 11 Jun 2025 23:50 )  Alb: 4.2 g/dL / Pro: 6.7 g/dL / ALK PHOS: 80 U/L / ALT: 11 U/L / AST: 16 U/L / GGT: x               IMAGING:    < from: CT Angio Abdomen and Pelvis w/ IV Cont (06.12.25 @ 01:26) >    ACC: [id number] EXAM:  CT ANGIO ABD PELV (W)AW IC   ORDERED BY: NORMAN RODARTE     PROCEDURE DATE:  06/12/2025          INTERPRETATION:  STUDY INDICATION: GI bleed , bloody stools    TECHNIQUE: CT of the abdomen and pelvis obtained without intravenous   contrast. CT angiogram and subsequent CT venous phase of the abdomen and   pelvis was obtained following administration of intravenous contrast.   Oral contrast was not administered.  Reformatted images in the coronal   and sagittal planes wereacquired.    COMPARISON CT: CT abdomen pelvis 5/19/2025.    FINDINGS:    VASCULAR: No CTA evidence of acute GI bleed. Celiac trunk, SMA, bilateral   renal arteries are patent. BELLO is not well visualized.    LOWER CHEST: Unremarkable.    HEPATOBILIARY: Unremarkable.    SPLEEN: Unremarkable.    PANCREAS: Unremarkable.    ADRENAL GLANDS: Unremarkable.    KIDNEYS: No hydronephrosis. Distal right ureteral calculi near the near   the urinary bladder, largest measures 3 x 3 by 4 mm (Hounsfield unit   1562).    Nonobstructing left lower pole renal calculus. Subcentimeter   hypodensities too small to characterize.    ABDOMINOPELVIC NODES: Unremarkable.    PELVIC ORGANS: Unremarkable.    PERITONEUM/MESENTERY/BOWEL: Colonic diverticulosis. Status post partial   colonic resection with anastomosis in the left lower quadrant. No   evidence of bowel obstruction. No ascites or pneumoperitoneum.    BONES/SOFT TISSUES: No acute osseous abnormality. Small fat-containing   umbilical hernia..    OTHER: Normalcaliber abdominal aorta..      IMPRESSION:    No CTA evidence of acute GI bleed.    Calculi in the distal right ureter, largest measures 3 x 3 by 4 mm   (Hounsfield unit 1562). Minimal hydroureter.    Nonobstructing left lower pole renal calculus.    These findings were discussed with KHLOE RODARTE [phone] at   6/12/2025 4:47 AM by Radiology Resident Dr. Max with read back   confirmation.    --- End of Report ---          NANCY MAX MD; Resident Radiologist  This document has been electronically signed.  ZULEIMA CONWAY MD; Attending Radiologist  This document has been electronically signed. Jun 12 2025  5:09AM    < end of copied text >       Chief complaint/Reason for consult: BRBPR    HPI:  48 y/o M w/PMHx of diverticulosis with colon resection, R renal stones comes to emergency room for GI bleed. patient states that since 6/10 he has had 2 episodes per day of BRBPR. Denies ever having GIB or any blood transfusions. States he had a fall and injured his L flank region while cleaning up his yard for daughters graduation party and thinks perhaps GIB may be related to the the trauma. patient states that he has no chest pain, no shortness of breath. no weakness. no abd pain N/v/D . Denies HA, dizziness.  (12 Jun 2025 05:16)    GI updates: 49yMale pmh episodes of diverticulitis s/p sigmoid resection 2016 had Colonoscopy with Dr. Queen prior and showed diverticulosis never had Colonoscopy since, right renal stones taking N-saids the past few weeks for pain presents for two days of BRBPR. Patient denies nausea, vomiting, hematemesis, melena,  diarrhea, constipation, abdominal pain. +blood in stool      PAST MEDICAL & SURGICAL HISTORY:   Renal stones  Diverticulosis  History of colon resection  GIB (gastrointestinal bleeding)      Family history:  FAMILY HISTORY:    No GI cancers in first or second degree relatives    Social History: +2-3 cigarettes daily smoking. +daily wine alcohol. No illegal drug use.    Allergies:   No Known Allergies  Intolerances      MEDICATIONS  (STANDING):  chlorhexidine 2% Cloths 1 Application(s) Topical <User Schedule>  pantoprazole  Injectable 40 milliGRAM(s) IV Push every 12 hours  sodium chloride 0.9%. 1000 milliLiter(s) (80 mL/Hr) IV Continuous <Continuous>    MEDICATIONS  (PRN):  acetaminophen     Tablet .. 650 milliGRAM(s) Oral every 6 hours PRN Temp greater or equal to 38C (100.4F), Mild Pain (1 - 3)  aluminum hydroxide/magnesium hydroxide/simethicone Suspension 30 milliLiter(s) Oral every 4 hours PRN Dyspepsia  ondansetron Injectable 4 milliGRAM(s) IV Push every 8 hours PRN Nausea and/or Vomiting        REVIEW OF SYSTEMS  General:  No weight loss, fevers, or chills.  Eyes:  No reported pain or visual changes  ENT:  No sore throat or runny nose.  NECK: No stiffness   CV:  No chest pain or palpitations.  Resp:  No shortness of breath, cough, wheezing or hemoptysis  GI:  No abdominal pain, nausea, vomiting, dysphagia, diarrhea or constipation. + rectal bleeding, no melena, or hematemesis.  Neuro:  No tingling, numbness       VITALS:   T(F): 98.2 (06-12-25 @ 05:01), Max: 98.2 (06-12-25 @ 03:52)  HR: 65 (06-12-25 @ 05:01) (65 - 116)  BP: 148/82 (06-12-25 @ 05:01) (147/96 - 152/83)  RR: 18 (06-12-25 @ 05:01) (18 - 18)  SpO2: 99% (06-12-25 @ 05:01) (99% - 100%)    PHYSICAL EXAM:  GENERAL: AAOx3, no acute distress.  HEAD:  Atraumatic, Normocephalic  EYES: conjunctiva and sclera clear  NECK: Supple, No thyromegaly   CHEST/LUNG: Clear to auscultation bilaterally; No wheeze, rhonchi, or rales  HEART: Regular rate and rhythm; normal S1, S2, No murmurs.  ABDOMEN: Soft, nontender, nondistended; Bowel sounds present, bruising on left side of abdomen noted  NEUROLOGY: No asterixis or tremor  SKIN: Intact, no jaundice  Rectal exam-trace red blood in rectal vault        LABS:  06-11    138  |  102  |  18  ----------------------------<  98  3.9   |  22  |  0.8    Ca    9.0      11 Jun 2025 23:50    TPro  6.7  /  Alb  4.2  /  TBili  <0.2  /  DBili  x   /  AST  16  /  ALT  11  /  AlkPhos  80  06-11                          9.0    6.45  )-----------( 195      ( 12 Jun 2025 07:16 )             25.9     LIVER FUNCTIONS - ( 11 Jun 2025 23:50 )  Alb: 4.2 g/dL / Pro: 6.7 g/dL / ALK PHOS: 80 U/L / ALT: 11 U/L / AST: 16 U/L / GGT: x               IMAGING:    < from: CT Angio Abdomen and Pelvis w/ IV Cont (06.12.25 @ 01:26) >    ACC: [id number] EXAM:  CT ANGIO ABD PELV (W)AW IC   ORDERED BY: NORMAN RODARTE     PROCEDURE DATE:  06/12/2025          INTERPRETATION:  STUDY INDICATION: GI bleed , bloody stools    TECHNIQUE: CT of the abdomen and pelvis obtained without intravenous   contrast. CT angiogram and subsequent CT venous phase of the abdomen and   pelvis was obtained following administration of intravenous contrast.   Oral contrast was not administered.  Reformatted images in the coronal   and sagittal planes wereacquired.    COMPARISON CT: CT abdomen pelvis 5/19/2025.    FINDINGS:    VASCULAR: No CTA evidence of acute GI bleed. Celiac trunk, SMA, bilateral   renal arteries are patent. BELLO is not well visualized.    LOWER CHEST: Unremarkable.    HEPATOBILIARY: Unremarkable.    SPLEEN: Unremarkable.    PANCREAS: Unremarkable.    ADRENAL GLANDS: Unremarkable.    KIDNEYS: No hydronephrosis. Distal right ureteral calculi near the near   the urinary bladder, largest measures 3 x 3 by 4 mm (Hounsfield unit   1562).    Nonobstructing left lower pole renal calculus. Subcentimeter   hypodensities too small to characterize.    ABDOMINOPELVIC NODES: Unremarkable.    PELVIC ORGANS: Unremarkable.    PERITONEUM/MESENTERY/BOWEL: Colonic diverticulosis. Status post partial   colonic resection with anastomosis in the left lower quadrant. No   evidence of bowel obstruction. No ascites or pneumoperitoneum.    BONES/SOFT TISSUES: No acute osseous abnormality. Small fat-containing   umbilical hernia..    OTHER: Normalcaliber abdominal aorta..      IMPRESSION:    No CTA evidence of acute GI bleed.    Calculi in the distal right ureter, largest measures 3 x 3 by 4 mm   (Hounsfield unit 1562). Minimal hydroureter.    Nonobstructing left lower pole renal calculus.    These findings were discussed with KHLOE RODARTE [phone] at   6/12/2025 4:47 AM by Radiology Resident Dr. Max with read back   confirmation.    --- End of Report ---          NANCY MAX MD; Resident Radiologist  This document has been electronically signed.  ZULEIMA CONWAY MD; Attending Radiologist  This document has been electronically signed. Jun 12 2025  5:09AM    < end of copied text >

## 2025-06-12 NOTE — ED PROVIDER NOTE - ED STEMI HIDDEN
Discharge instructions given and pt informed prescriptions were sent to pharmacy, pt voiced understanding, ambulatory to exit without incident.       Bishop Reddy, RN  11/14/22 [id number]

## 2025-06-12 NOTE — MEDICAL STUDENT PROGRESS NOTE(EDUCATION) - PLAN 1
CT A/P was reviewed and noted to mild R hydroureter. Stone measuring 3x3x4. L ureter at baseline.   - Monitor patient for any signs of fever, bruising, pallor, or worsening symptoms.   - Observe for the next 3-4 hours for void of stone.   - Consider Flomax 0.4mg QD and IVF. CT A/P: Calculi in the distal right ureter, largest measures 3 x 3 by 4 mm  Minimal hydroureter        - Monitor patient for any signs of fever, bruising, pallor, or worsening symptoms.   - Observe for the next 3-4 hours for void of stone.   - Consider Flomax 0.4mg QD and IVF.

## 2025-06-12 NOTE — ED PROVIDER NOTE - PHYSICAL EXAMINATION
Physical Exam    Vital Signs: I have reviewed the initial vital signs.  Constitutional: well-nourished, appears stated age, no acute distress  Eyes: Conjunctiva pink, Sclera clear, PERRLA, EOMI.  Cardiovascular: S1 and S2, regular rate, regular rhythm, well-perfused extremities, radial pulses equal and 2+  Respiratory: unlabored respiratory effort, clear to auscultation bilaterally no wheezing, rales and rhonchi  Gastrointestinal: soft, non-tender abdomen, no pulsatile mass, normal bowl sounds  Rectal: Consent obtained. Performed by Dr. Scott, Chaperone PA Bryce, + red blood   Musculoskeletal: supple neck, no lower extremity edema, no midline tenderness  Integumentary: warm, dry, no rash  Neurologic: awake, alert, cranial nerves II-XII grossly intact, extremities’ motor and sensory functions grossly intact  Psychiatric: appropriate mood, appropriate affect

## 2025-06-12 NOTE — H&P ADULT - NSHPPHYSICALEXAM_GEN_ALL_CORE
Rectal: Consent obtained. Performed by Dr. Scott, Chaperone KHLOE Wu, + red blood VITALS:   T(C): 36.8 (06-12-25 @ 05:01), Max: 36.8 (06-12-25 @ 03:52)  HR: 65 (06-12-25 @ 05:01) (65 - 116)  BP: 148/82 (06-12-25 @ 05:01) (147/96 - 152/83)  RR: 18 (06-12-25 @ 05:01) (18 - 18)  SpO2: 99% (06-12-25 @ 05:01) (99% - 100%)    GENERAL: NAD, lying in bed comfortably and pleasant  HEAD:  Atraumatic, Normocephalic  EYES: EOMI, conjunctiva and sclera clear  ENT: Moist mucous membranes  NECK: Supple, No JVD  CHEST/LUNG: CTA b/l;  Unlabored respirations, + healing echymosis to L flank   HEART: Regular rate and rhythm; No murmurs  ABDOMEN: BS present; Soft, Nontender, Nondistended  EXTREMITIES:  No clubbing, cyanosis, or edema  NERVOUS SYSTEM:  Alert & Oriented X3, speech clear. No deficits   MSK: FROM all 4 extremities, full and equal strength  SKIN: No rashes or lesions      Rectal: Consent obtained. Performed by ED MD Dr. Scott, Chaperone PA Bryce, + red blood

## 2025-06-12 NOTE — H&P ADULT - NSHPLABSRESULTS_GEN_ALL_CORE
LABS:  cret                        8.8    6.64  )-----------( 205      ( 12 Jun 2025 01:32 )             26.0     06-11    138  |  102  |  18  ----------------------------<  98  3.9   |  22  |  0.8    Ca    9.0      11 Jun 2025 23:50    TPro  6.7  /  Alb  4.2  /  TBili  <0.2  /  DBili  x   /  AST  16  /  ALT  11  /  AlkPhos  80  06-11        RADIOLOGY:    CT Angio Abdomen and Pelvis w/ IV Cont (06.12.25 @ 01:26)     IMPRESSION:    No CTA evidence of acute GI bleed.

## 2025-06-12 NOTE — H&P ADULT - NSICDXPASTMEDICALHX_GEN_ALL_CORE_FT
PAST MEDICAL HISTORY:  Diverticulosis     GIB (gastrointestinal bleeding)     History of colon resection     Renal stones

## 2025-06-12 NOTE — H&P ADULT - ASSESSMENT
# GI bleed  # Anemia. 48 y/o M w/PMHx of diverticulosis with colon resection, R renal stones comes to emergency room for GI bleed    # GI bleed  # Anemia  - Hg 9.9-->8.8, was 14.5 on 5/19  - trend H/H  - protonix 40mh IV Q12H  - [facility]  - hold VTE  - heparin T&S  - GI consult  - repeat labs     48 y/o M w/PMHx of diverticulosis with colon resection, R renal stones comes to emergency room for GI bleed    # GI bleed  # Anemia  - Hg 9.9-->8.8, was 14.5 on 5/19  - trend H/H  - protonix 40mh IV Q12H  - [facility]  - hold VTE heparin  - T&S  - GI consult  - repeat labs     50 y/o M w/PMHx of diverticulosis with colon resection, R renal stones comes to emergency room for GI bleed    # GI bleed  # Anemia  - Hg 9.9-->8.8, was 14.5 on 5/19  - trend H/H  - protonix 40mh IV Q12H  - [facility]  - hold VTE heparin  - T&S  - GI consult  - repeat labs    #Progress Note Handoff  Pending (specify):  as above   Family discussion:  plan of care was discussed with patient   in details.  all questions were answered.  seems to understand, and in agreement  Disposition: home

## 2025-06-12 NOTE — CONSULT NOTE ADULT - TIME BILLING
Reviewed all pertinent clinical information and reviewed all relevant imaging and diagnostic studies.  Counseled the patient  about diagnostic testing and treatment plan. All questions were answered.  Discussed recommendations with the primary team and coordinated care. Time spent on this encounter excludes teaching time and separately reported services.

## 2025-06-12 NOTE — MEDICAL STUDENT PROGRESS NOTE(EDUCATION) - SUBJECTIVE AND OBJECTIVE BOX
SUBJECTIVE:    CC: Rectal bleeding, abdominal pain, and urinary symptoms    HPI:    This is a 49-year-old M with a PMHx of diverticulitis and prior diverticular surgery (2016), presents with complaints of rectal bleeding and abdominal symptoms.   Three weeks ago, he visited the ER for kidney stones. Patient was to follow-up with Kamran Torrez and referral to Urology (Dr. Leon) was scheduled for (06/18).   -- x2 nights ago, the patient reports to tenesmus followed by bright red blood per rectum. The patient is unsure if the blood is from the stool or urine.   -- x1 night ago, the patient mentioned developing abdominal cramping, blood per rectum, diaphoresis, dizziness, hematuria, and fever. He had the urge to urinate but was unsure if he had actually gone. Patient reports his last BM to be x1 day ago.   -- Today, per the patient, currently passing flatulence and small amounts of bright red blood with wiping.   Patient admits to smoking 1-2 cigarettes QD and drinks 10-12oz of wine/ day. Patient currently takes a multivitamin QD. NKDA. Patient denies currently, fever, hx of bleeding/ clotting d/o, rashes, exposure to sick contacts, or N/V.  No other complaints or concerns were mentioned at this time.     ROS:  - GENERAL: + fever.  + diaphoresis. Denies weight loss  - CARDIO: denies chest pain, palpitations, edema.  - PULM: denies SOB, cough, wheeze  - GI: +abd cramping, + bright red blood per rectum. Denies appetite changes, N/V/  - : +hematuria, +flatulence, + bloating. Denies dysuria, frequency  - NEURO: + dizziness. Denies syncope, weakness, numbness or tingling.     PAST MEDICAL HX:  - Diverticulosis  - Kidney Stones    PAST SURGICAL HX:  - Elective Sigmoidectomy    MEDICATIONS:  - Multivitamin QD    ALLERGIES:  - NKDA    FAMILY HX:  - non-contributory    SOCIAL HX:  - Current smoker: 1-2 cigarettes/ day  - No drug use  - Alcohol: wine, 10-12oz/ day    OBJECTIVE:    Vital Signs Last 24 Hrs  T(C): 36.8 (12 Jun 2025 05:01), Max: 36.8 (12 Jun 2025 03:52)  T(F): 98.2 (12 Jun 2025 05:01), Max: 98.2 (12 Jun 2025 03:52)  HR: 65 (12 Jun 2025 05:01) (65 - 116)  BP: 148/82 (12 Jun 2025 05:01) (147/96 - 152/83)  RR: 18 (12 Jun 2025 05:01) (18 - 18)  SpO2: 99% (12 Jun 2025 05:01) (99% - 100%)  --------------  Parameters below as of 12 Jun 2025 05:01  Patient On (Oxygen Delivery Method): room air      PHYSICAL EXAM:  - GENERAL: alert, awake, and oriented x3  - CARDIOVASCULAR: regular rate and rhythm. No murmurs, rubs, or gallops.  - RESPIRATORY: clear to auscultation bilaterally. No rales, wheezes, or rhonchi.   - GASTROINTESTINAL: No distension or surgical scars. Normal Bowel sounds. No palpable masses. No rebound or guarding Abdomen is tympanic no shifting dullness.   - GENITOURINARY: No CVA tenderness. No suprapubic tenderness.    LABS:                      9.0    6.45  )-----------( 195      ( 12 Jun 2025 07:16 )             25.9     06-11    138  |  102  |  18  ----------------------------<  98  3.9   |  22  |  0.8    Ca    9.0      11 Jun 2025 23:50    TPro  6.7  /  Alb  4.2  /  TBili  <0.2  /  DBili  x   /  AST  16  /  ALT  11  /  AlkPhos  80  06-11      IMAGING    CT ABD/ PELVIS W/ IV CONTRAST  IMPRESSION:  No CTA evidence of acute GI bleed.  Calculi in the distal right ureter, largest measures 3 x 3 by 4 mm   (Hounsfield unit 1562). Minimal hydroureter.  Nonobstructing left lower pole renal calculus.       SUBJECTIVE:    CC: Rectal bleeding, abdominal pain, and urinary symptoms    HPI:    This is a 49-year-old M with a PMHx of diverticulosis w/hx of colon resection in 2016, who presents with complaints of rectal bleeding and abdominal bloating/cramping x 3 days. He also states that three weeks ago, he visited the ER for right flank pain, and was diagnosed with a right mid ureteral stone --DC'd from [facility] stones. Patient was to follow-up with Kamran Torrez and referral to Urology (Dr. Leon) was scheduled for (06/18).   -- x2 nights ago, the patient reports to marlenmus followed by bright red blood per rectum. The patient is unsure if the blood is from the stool or urine.   -- x1 night ago, the patient mentioned developing abdominal cramping, blood per rectum, diaphoresis, dizziness, hematuria, and fever. He had the urge to urinate but was unsure if he had actually gone. Patient reports his last BM to be x1 day ago.   -- Today, per the patient, currently passing flatulence and small amounts of bright red blood with wiping.   Patient admits to smoking 1-2 cigarettes QD and drinks 10-12oz of wine/ day. Patient currently takes a multivitamin QD. NKDA. Patient denies currently, fever, hx of bleeding/ clotting d/o, rashes, exposure to sick contacts, or N/V.  No other complaints or concerns were mentioned at this time.     ROS:  - GENERAL: + fever.  + diaphoresis. Denies weight loss  - CARDIO: denies chest pain, palpitations, edema.  - PULM: denies SOB, cough, wheeze  - GI: +abd cramping, + bright red blood per rectum. Denies appetite changes, N/V/  - : +hematuria, +flatulence, + bloating. Denies dysuria, frequency  - NEURO: + dizziness. Denies syncope, weakness, numbness or tingling.     PAST MEDICAL HX:  - Diverticulosis  - Kidney Stones    PAST SURGICAL HX:  - Elective Sigmoidectomy    MEDICATIONS:  - Multivitamin QD    ALLERGIES:  - NKDA    FAMILY HX:  - non-contributory    SOCIAL HX:  - Current smoker: 1-2 cigarettes/ day  - No drug use  - Alcohol: wine, 10-12oz/ day    OBJECTIVE:    Vital Signs Last 24 Hrs  T(C): 36.8 (12 Jun 2025 05:01), Max: 36.8 (12 Jun 2025 03:52)  T(F): 98.2 (12 Jun 2025 05:01), Max: 98.2 (12 Jun 2025 03:52)  HR: 65 (12 Jun 2025 05:01) (65 - 116)  BP: 148/82 (12 Jun 2025 05:01) (147/96 - 152/83)  RR: 18 (12 Jun 2025 05:01) (18 - 18)  SpO2: 99% (12 Jun 2025 05:01) (99% - 100%)  --------------  Parameters below as of 12 Jun 2025 05:01  Patient On (Oxygen Delivery Method): room air      PHYSICAL EXAM:  - GENERAL: alert, awake, and oriented x3  - CARDIOVASCULAR: regular rate and rhythm. No murmurs, rubs, or gallops.  - RESPIRATORY: clear to auscultation bilaterally. No rales, wheezes, or rhonchi.   - GASTROINTESTINAL: No distension or surgical scars. Normal Bowel sounds. No palpable masses. No rebound or guarding Abdomen is tympanic no shifting dullness.   - GENITOURINARY: No CVA tenderness. No suprapubic tenderness.    LABS:                      9.0    6.45  )-----------( 195      ( 12 Jun 2025 07:16 )             25.9     06-11    138  |  102  |  18  ----------------------------<  98  3.9   |  22  |  0.8    Ca    9.0      11 Jun 2025 23:50    TPro  6.7  /  Alb  4.2  /  TBili  <0.2  /  DBili  x   /  AST  16  /  ALT  11  /  AlkPhos  80  06-11      IMAGING    CT ABD/ PELVIS W/ IV CONTRAST  IMPRESSION:  No CTA evidence of acute GI bleed.  Calculi in the distal right ureter, largest measures 3 x 3 by 4 mm   (Hounsfield unit 1562). Minimal hydroureter.  Nonobstructing left lower pole renal calculus.       SUBJECTIVE:    CC: Rectal bleeding, abdominal pain, and urinary symptoms    HPI:    This is a 49-year-old M with a PMHx of diverticulosis w/hx of colon resection in 2016, who presents with complaints of rectal bleeding and abdominal bloating/cramping x 3 days. He also states that three weeks ago, he visited the ER for right flank pain, and was diagnosed with a right mid ureteral stone --DC'd from ER at that time.  Patient followed-up with Dr. Torrez ([facility] [place])  --  and referral to Urology (Dr. Norwood was scheduled for (06/18).   -- x2 nights ago, the patient reports to marlenmus followed by bright red blood per rectum. The patient is unsure if the blood is from the stool or urine.   -- x1 night ago, the patient mentioned developing abdominal cramping, blood per rectum, diaphoresis, dizziness, hematuria, and fever. He had the urge to urinate but was unsure if he had actually gone. Patient reports his last BM to be x1 day ago.   -- Today, per the patient, currently passing flatulence and small amounts of bright red blood with wiping.  He also denies right flank pain/current hematuria/dysuria; admits to occasional pain radiating to right scrotum (but none today);   Patient admits to smoking 1-2 cigarettes QD and drinks 10-12oz of wine/ day. Patient currently takes a multivitamin QD. NKDA. Patient denies currently, fever, hx of bleeding/ clotting d/o, rashes, exposure to sick contacts, or N/V.      ROS:  - GENERAL: + fever.  + diaphoresis. Denies weight loss  - CARDIO: denies chest pain, palpitations, edema.  - PULM: denies SOB, cough, wheeze  - GI: +abd cramping, + bright red blood per rectum. Denies appetite changes, N/V/  - : +hematuria, +flatulence, + bloating. Denies dysuria, frequency  - NEURO: + dizziness. Denies syncope, weakness, numbness or tingling.     PAST MEDICAL HX:  - Diverticulosis  - Kidney Stones    PAST SURGICAL HX:  - colon resection 2016    MEDICATIONS:  - Multivitamin QD    ALLERGIES:  - NKDA    FAMILY HX:  - non-contributory    SOCIAL HX:  - Current smoker: 1-2 cigarettes/ day  - No drug use  - Alcohol: wine, 10-12oz/ day  -    OBJECTIVE:    Vital Signs Last 24 Hrs  T(C): 36.8 (12 Jun 2025 05:01), Max: 36.8 (12 Jun 2025 03:52)  T(F): 98.2 (12 Jun 2025 05:01), Max: 98.2 (12 Jun 2025 03:52)  HR: 65 (12 Jun 2025 05:01) (65 - 116)  BP: 148/82 (12 Jun 2025 05:01) (147/96 - 152/83)  RR: 18 (12 Jun 2025 05:01) (18 - 18)  SpO2: 99% (12 Jun 2025 05:01) (99% - 100%)  --------------  Parameters below as of 12 Jun 2025 05:01  Patient On (Oxygen Delivery Method): room air      PHYSICAL EXAM:  - GENERAL: alert, awake, and oriented x3  - CARDIOVASCULAR: regular rate and rhythm. No murmurs, rubs, or gallops.  - RESPIRATORY: clear to auscultation bilaterally. No rales, wheezes, or rhonchi.   - GASTROINTESTINAL: soft, NT/ND  - GENITOURINARY: No CVA tenderness B/L. No suprapubic tenderness.    LABS:                      9.0    6.45  )-----------( 195      ( 12 Jun 2025 07:16 )             25.9     06-11    138  |  102  |  18  ----------------------------<  98  3.9   |  22  |  0.8    Ca    9.0      11 Jun 2025 23:50    TPro  6.7  /  Alb  4.2  /  TBili  <0.2  /  DBili  x   /  AST  16  /  ALT  11  /  AlkPhos  80  06-11      IMAGING  CT Angio Abdomen and Pelvis w/ IV Cont (06.12.25 @ 01:26) >  IMPRESSION:    No CTA evidence of acute GI bleed.    Calculi in the distal right ureter, largest measures 3 x 3 by 4 mm   (Hounsfield unit 1562). Minimal hydroureter.    Nonobstructing left lower pole renal calculus.

## 2025-06-13 VITALS
SYSTOLIC BLOOD PRESSURE: 119 MMHG | DIASTOLIC BLOOD PRESSURE: 87 MMHG | OXYGEN SATURATION: 100 % | HEART RATE: 61 BPM | TEMPERATURE: 98 F

## 2025-06-13 PROCEDURE — 45380 COLONOSCOPY AND BIOPSY: CPT | Mod: XU

## 2025-06-13 PROCEDURE — 88312 SPECIAL STAINS GROUP 1: CPT | Mod: 26

## 2025-06-13 PROCEDURE — 99239 HOSP IP/OBS DSCHRG MGMT >30: CPT

## 2025-06-13 PROCEDURE — 88305 TISSUE EXAM BY PATHOLOGIST: CPT | Mod: 26

## 2025-06-13 PROCEDURE — 45385 COLONOSCOPY W/LESION REMOVAL: CPT

## 2025-06-13 PROCEDURE — 99232 SBSQ HOSP IP/OBS MODERATE 35: CPT

## 2025-06-13 PROCEDURE — 43239 EGD BIOPSY SINGLE/MULTIPLE: CPT | Mod: XS

## 2025-06-13 RX ORDER — TAMSULOSIN HYDROCHLORIDE 0.4 MG/1
1 CAPSULE ORAL
Refills: 0 | DISCHARGE

## 2025-06-13 RX ORDER — TRAMADOL HYDROCHLORIDE 50 MG/1
1 TABLET, FILM COATED ORAL
Qty: 10 | Refills: 0
Start: 2025-06-13 | End: 2025-06-17

## 2025-06-13 NOTE — DISCHARGE NOTE NURSING/CASE MANAGEMENT/SOCIAL WORK - FINANCIAL ASSISTANCE
[facility] provides services at a reduced cost to those who are determined to be eligible through [facility]’s financial assistance program. Information regarding [facility]’s financial assistance program can be found by going to [email or web address] or by calling [phone].

## 2025-06-13 NOTE — DISCHARGE NOTE PROVIDER - ATTENDING DISCHARGE PHYSICAL EXAMINATION:
T(C): 36.5 (06-13-25 @ 18:01), Max: 36.6 (06-13-25 @ 11:22)  HR: 61 (06-13-25 @ 18:01) (61 - 71)  BP: 119/87 (06-13-25 @ 18:01) (109/62 - 151/81)  RR: 15 (06-13-25 @ 17:32) (14 - 18)  SpO2: 100% (06-13-25 @ 18:01) (100% - 100%)    CONSTITUTIONAL: Well groomed, no apparent distress  EYES: PERRLA and symmetric, EOMI  RESP: No respiratory distress, no use of accessory muscles; CTA b/l  CV: RRR, +S1S2, no MRG; no JVD; no peripheral edema  GI: Soft, NT, ND, no rebound  MSK: Normal gait; No edema on b/l LE  SKIN: scattered bruises on left flank  NEURO: CN II-XII intact; no focal deficit

## 2025-06-13 NOTE — DISCHARGE NOTE PROVIDER - NSDCCPCAREPLAN_GEN_ALL_CORE_FT
PRINCIPAL DISCHARGE DIAGNOSIS  Diagnosis: GI bleed  Assessment and Plan of Treatment: EGD Impressions  	Normal mucosa in the whole esophagus.  	Erosions in the antrum compatible with erosive gastritis. (Biopsy).  	Normal mucosa in the whole examined duodenum. (Biopsy)  Colonoscopy Impressions   Polyp (6 mm) in the proximal sigmoid colon. (Polypectomy).  	Polyp (9 mm) in the rectum. (Polypectomy).  	Polyp (5 mm) in the mid rectum. (Polypectomy).  	External hemorrhoids.  	Moderate diverticulosis of the whole colon.  	The colon was otherwise unremarkable; no blood noted in lumen.   - Follow up with our GI office located on [address] [place] [address], Phone number [phone] with Dr. Oleary for pathology results  - please avoid NSAIDs  - you would need colonoscopy in 3 years (colon polyps)  - resume diet as tolerated      SECONDARY DISCHARGE DIAGNOSES  Diagnosis: Anemia  Assessment and Plan of Treatment: likely secondary to diverticulosis  - please follow up with your PCP in 1 week for repeat CBC     PRINCIPAL DISCHARGE DIAGNOSIS  Diagnosis: GI bleed  Assessment and Plan of Treatment: EGD Impressions  	Normal mucosa in the whole esophagus.  	Erosions in the antrum compatible with erosive gastritis. (Biopsy).  	Normal mucosa in the whole examined duodenum. (Biopsy)  Colonoscopy Impressions   Polyp (6 mm) in the proximal sigmoid colon. (Polypectomy).  	Polyp (9 mm) in the rectum. (Polypectomy).  	Polyp (5 mm) in the mid rectum. (Polypectomy).  	External hemorrhoids.  	Moderate diverticulosis of the whole colon.  	The colon was otherwise unremarkable; no blood noted in lumen.   - Follow up with our GI office located on [address] [place] [address], Phone number [phone] with Dr. Oleary for pathology results  - please avoid NSAIDs  - you would need colonoscopy in 3 years (colon polyps)  - resume diet as tolerated      SECONDARY DISCHARGE DIAGNOSES  Diagnosis: Anemia  Assessment and Plan of Treatment: likely secondary to diverticulosis  - please follow up with your PCP in 1 week for repeat CBC    Diagnosis: Kidney stone  Assessment and Plan of Treatment: continoe flomax as directed  please ensure adequate oral dyrdation  avoid nsaids  - you can take Tramadol for severe pain if needed. Rx will be sent to your pharmacy  - if fever, nausea, vomiting, severe flank pain, please come back to ER  - follow up with Dr. Norwood as originally scheduled

## 2025-06-13 NOTE — CHART NOTE - NSCHARTNOTEFT_GEN_A_CORE
PACU ANESTHESIA ADMISSION NOTE      Procedure: EGD/colonoscopy  Post op diagnosis:  erosive gastritis, diverticulosis, polyps    __x__  Patent Airway    __x__  Full return of protective reflexes    __x__  Full recovery from anesthesia / back to baseline status    Vitals:  T(C): 36.4 (06-13-25 @ 15:27), Max: 36.6 (06-13-25 @ 05:54)  HR: 62 (06-13-25 @ 15:27) (61 - 62)  BP: 151/81 (06-13-25 @ 15:27) (109/62 - 151/81)  RR: 18 (06-13-25 @ 15:27) (18 - 18)  SpO2: 100% (06-13-25 @ 15:27) (96% - 100%)    Mental Status:  __x__ Awake   ___x__ Alert   _____ Drowsy   _____ Sedated    Nausea/Vomiting:  __x__ NO  ______Yes,   See Post - Op Orders          Pain Scale (0-10):  __0___    Treatment: ____ None    __x__ See Post - Op/PCA Orders    Post - Operative Fluids:   ____ Oral   __x__ See Post - Op Orders    Plan: Discharge:   __x__Home       _____Floor     _____Critical Care    _____  Other:_________________    Comments: Patient had smooth intraoperative event, no anesthesia complication.  PACU Vital signs: HR: 76           BP:        124/64          RR: 16            O2 Sat:       100%     Temp 97.7F

## 2025-06-13 NOTE — PROGRESS NOTE ADULT - ASSESSMENT
50 yo male with h/o diverticulosis s/p partial colon resection,  right renal stone presented to ED with BRBPR for 2 days.     BRBPR  -multiple episodes in the past few days  -no associated abdominal pain, ddx: diverticulosis, hemorrhoid, less likely ischemic bowel  -ID was consulted, scheduled colonoscopy today    Anemia  -2/2 acute blood loss  -monitor H&H, active type and screen, transfuse as needed    Handoff: pending colonoscopy 
This is a 49-year-old M with a PMHx of Diverticulosis, hx colon resection 2016, recently diagnosed right ureteral stone,  presenting with blood per rectum and abdominal pain    1. R/O GI bleed  2. Distal right ureteral stone w/minimal hydroureter and asymtomatic       - no acute  intervention  -continue to strain urine  -can get KUB to see if stone still present  -  flomax  -UA 6/12: trace leukocytes, WBC 10, RBC 10  -trend labs, creatinine  -Please alert  service immediately if patient develops fever, intractable pain, change in vital signs - would need emergent stent   -will DW attending and cont to follow  -Per GI: pt foe EGD/colonoscopy this admission

## 2025-06-13 NOTE — DISCHARGE NOTE NURSING/CASE MANAGEMENT/SOCIAL WORK - NSDCPEFALRISK_GEN_ALL_CORE
For information on Fall & Injury Prevention, visit: [email or web address] OR  [email or web address] OR  https://www.cdc.gov/steadi/patient.html

## 2025-06-13 NOTE — DISCHARGE NOTE PROVIDER - HOSPITAL COURSE
50 yo male with h/o diverticulosis s/p partial colon resection,  right renal stone presented to ED with BRBPR for 2 days.     #BRBPR  - multiple episodes in the past few days  - no associated abdominal pain, ddx: diverticulosis, hemorrhoid, less likely ischemic bowel  - s/p EGD/colonoscopy today  - EGD Impressions  	Normal mucosa in the whole esophagus.  	Erosions in the antrum compatible with erosive gastritis. (Biopsy).  	Normal mucosa in the whole examined duodenum. (Biopsy)  Colonoscopy Impressions   Polyp (6 mm) in the proximal sigmoid colon. (Polypectomy).  	Polyp (9 mm) in the rectum. (Polypectomy).  	Polyp (5 mm) in the mid rectum. (Polypectomy).  	External hemorrhoids.  	Moderate diverticulosis of the whole colon.  	The colon was otherwise unremarkable; no blood noted in lumen. 	  - Await pathology results.  -Colonoscopy in 3 years, (colon polyps)  -diet as tolerated  - Follow up with our GI office located on [address] [place] [address], Phone number [phone] with Dr. Oleary.    #Anemia  -2/2 acute blood loss  -monitor H&H, active type and screen, transfuse as needed    Pt reports feeling well and wants to go home. He will follow up with his PCP and GI in 1 week for reassessment. 48 yo male with h/o diverticulosis s/p partial colon resection,  right renal stone presented to ED with BRBPR for 2 days.     #BRBPR  - multiple episodes in the past few days  - no associated abdominal pain, ddx: diverticulosis, hemorrhoid, less likely ischemic bowel  - s/p EGD/colonoscopy today  - EGD Impressions  	Normal mucosa in the whole esophagus.  	Erosions in the antrum compatible with erosive gastritis. (Biopsy).  	Normal mucosa in the whole examined duodenum. (Biopsy)  Colonoscopy Impressions   Polyp (6 mm) in the proximal sigmoid colon. (Polypectomy).  	Polyp (9 mm) in the rectum. (Polypectomy).  	Polyp (5 mm) in the mid rectum. (Polypectomy).  	External hemorrhoids.  	Moderate diverticulosis of the whole colon.  	The colon was otherwise unremarkable; no blood noted in lumen. 	  - Await pathology results.  -Colonoscopy in 3 years, (colon polyps)  -diet as tolerated  - Follow up with our GI office located on [address] [place] [address], Phone number [phone] with Dr. Oleary.    #Anemia  -2/2 acute blood loss  -monitor H&H, active type and screen, transfuse as needed    # Bilateral kidney stones  - Bilateral renal calculi. Right-sided hydronephrosis and right-sided hydroureter secondary to a 4 mm stone within the mid right ureter.   - continue flomax  - pain control  - outpatient follow up with Dr. Norwood (wed 6/18)    Pt reports feeling well and wants to go home. He will follow up with his PCP and GI in 1 week for reassessment.

## 2025-06-13 NOTE — DISCHARGE NOTE PROVIDER - CARE PROVIDERS DIRECT ADDRESSES
,[email or web address],[email or web address]

## 2025-06-13 NOTE — CHART NOTE - NSCHARTNOTEFT_GEN_A_CORE
PA notified to discharge patient as per attending.  Case discussed with Dr. Reid.  Patient stable no changes.  Patient wants to be discharged tonight,  and family at bedside.         T(C): 36.5 (06-13-25 @ 18:01), Max: 36.6 (06-13-25 @ 05:54)  HR: 61 (06-13-25 @ 18:01) (61 - 71)  BP: 119/87 (06-13-25 @ 18:01) (109/62 - 151/81)  RR: 15 (06-13-25 @ 17:32) (14 - 18)  SpO2: 100% (06-13-25 @ 18:01) (96% - 100%)        Patient discharged home.   RN aware and will manage.

## 2025-06-13 NOTE — DISCHARGE NOTE NURSING/CASE MANAGEMENT/SOCIAL WORK - PATIENT PORTAL LINK FT
You can access the FollowMyHealth Patient Portal offered by [facility] by registering at the following website: [email or web address]. By joining [employer]’s FollowMyHealth portal, you will also be able to view your health information using other applications (apps) compatible with our system.

## 2025-06-13 NOTE — MEDICAL STUDENT PROGRESS NOTE(EDUCATION) - SUBJECTIVE AND OBJECTIVE BOX
SUBJECTIVE:  This is a 49-year-old M presenting with a Right Distal Ureteral Stone. Patient admits to hematuria. Otherwise denies N/V, fever, flank pain, or abdominal pain.   Patient has a hx of diverticulosis and is scheduled for EGD and colonoscopy today; time unknown.   Patient has no other complaints at this time.    OBJECTIVE:    Vital Signs Last 24 Hrs  T(C): 36.6 (13 Jun 2025 05:54), Max: 36.7 (12 Jun 2025 14:00)  T(F): 97.8 (13 Jun 2025 05:54), Max: 98.1 (12 Jun 2025 14:00)  HR: 61 (13 Jun 2025 05:54) (61 - 67)  BP: 109/62 (13 Jun 2025 05:54) (109/62 - 144/81)  BP(mean): --  RR: 18 (13 Jun 2025 05:54) (18 - 18)  SpO2: 96% (13 Jun 2025 05:54) (96% - 100%)    PHYSICAL EXAM:  - Gen: alert, well-appearing, actively walking around. Afebrile  - Cardio: Normal rate and rhythm  - GI/: No CVA tenderness. No abdominal pain.     MEDICATIONS  (STANDING):  chlorhexidine 2% Cloths 1 Application(s) Topical <User Schedule>  pantoprazole  Injectable 40 milliGRAM(s) IV Push every 12 hours  sodium chloride 0.9%. 1000 milliLiter(s) (80 mL/Hr) IV Continuous <Continuous>    MEDICATIONS  (PRN):  acetaminophen     Tablet .. 650 milliGRAM(s) Oral every 6 hours PRN Temp greater or equal to 38C (100.4F), Mild Pain (1 - 3)  aluminum hydroxide/magnesium hydroxide/simethicone Suspension 30 milliLiter(s) Oral every 4 hours PRN Dyspepsia  ondansetron Injectable 4 milliGRAM(s) IV Push every 8 hours PRN Nausea and/or Vomiting    IMAGING:  CTA Abdomen and Pelvis W/ IV Contrast: 06/12/25  IMPRESSION:  No CTA evidence of acute GI bleed.  Calculi in the distal right ureter, largest measures 3 x 3 by 4 mm   (Hounsfield unit 1562). Minimal hydroureter.  Nonobstructing left lower pole renal calculus.

## 2025-06-13 NOTE — DISCHARGE NOTE PROVIDER - NSDCMRMEDTOKEN_GEN_ALL_CORE_FT
Flomax 0.4 mg oral capsule: 1 cap(s) orally once a day (at bedtime)  traMADol 50 mg oral tablet: 1 tab(s) orally 2 times a day as needed for  severe pain MDD: 2 tabs

## 2025-06-13 NOTE — DISCHARGE NOTE PROVIDER - CARE PROVIDER_API CALL
Caryn Oleary  Gastroenterology  [address] [facility] [place] [address]  Phone: [phone]  Fax: [phone]  Follow Up Time: 1 week    [facility]  Pulmonary Disease  [address] [place] [address]  Phone: ()-  Fax: ()-  Follow Up Time: 1 week   Caryn Oleary  Gastroenterology  [address] [facility] [place] [place] [address]  Phone: [phone]  Fax: [phone]  Follow Up Time: 1 week    Gilma Torrez  Pulmonary Disease  [address] [place] [address]  Phone: ()-  Fax: ()-  Follow Up Time: 1 week    Chance Norwood  Urology  [address] [place] [address]  Phone: [phone]  Fax: [phone]  Follow Up Time: 1 week

## 2025-06-13 NOTE — MEDICAL STUDENT PROGRESS NOTE(EDUCATION) - ASSESSMENT
This is a 49-year-old M presenting with a Right Distal Ureteral Stone. Patient has a h/o diverticulosis   Patient is afebrile.     PLAN:    RIGHT DISTAL URETERAL STONE:  - Continue to monitor void strains  - Continue IVF  - Keep NPO and a steady clear liquid diet ONLY  - Consider starting patient on Flomax 0.4mg   - Monitor for signs of infection.

## 2025-06-13 NOTE — DISCHARGE NOTE PROVIDER - PROVIDER TOKENS
PROVIDER:[TOKEN:[[id number]],FOLLOWUP:[1 week]],PROVIDER:[TOKEN:[[id number]],FOLLOWUP:[1 week]] PROVIDER:[TOKEN:[[id number]:[1 week]],PROVIDER:[TOKEN:[[id number]],FOLLOWUP:[1 week]],PROVIDER:[TOKEN:[[id number]],FOLLOWUP:[1 week]]

## 2025-06-13 NOTE — CHART NOTE - NSCHARTNOTEFT_GEN_A_CORE
Please refer to sunrise results section for EGD/Colonoscopy findings and recommendations Please refer to sunrise results section for EGD/Colonoscopy findings and recommendations  EGD Impressions  	Normal mucosa in the whole esophagus.  	Erosions in the antrum compatible with erosive gastritis. (Biopsy).  	Normal mucosa in the whole examined duodenum. (Biopsy)  Colonoscopy Impressions   Polyp (6 mm) in the proximal sigmoid colon. (Polypectomy).  	Polyp (9 mm) in the rectum. (Polypectomy).  	Polyp (5 mm) in the mid rectum. (Polypectomy).  	External hemorrhoids.  	Moderate diverticulosis of the whole colon.  	The colon was otherwise unremarkable; no blood noted in lumen.     Rec	  Await pathology results.  Colonoscopy in 3 years, (colon polyps) Please refer to sunrise results section for EGD/Colonoscopy findings and recommendations  EGD Impressions  	Normal mucosa in the whole esophagus.  	Erosions in the antrum compatible with erosive gastritis. (Biopsy).  	Normal mucosa in the whole examined duodenum. (Biopsy)  Colonoscopy Impressions   Polyp (6 mm) in the proximal sigmoid colon. (Polypectomy).  	Polyp (9 mm) in the rectum. (Polypectomy).  	Polyp (5 mm) in the mid rectum. (Polypectomy).  	External hemorrhoids.  	Moderate diverticulosis of the whole colon.  	The colon was otherwise unremarkable; no blood noted in lumen.     Rec	  Await pathology results.  Colonoscopy in 3 years, (colon polyps)  -diet as tolerated  - Follow up with our GI office located on [address] [place] [address], Phone number [phone] with Dr. Oleary

## 2025-06-13 NOTE — PROGRESS NOTE ADULT - SUBJECTIVE AND OBJECTIVE BOX
S;  Pt seen ambulating in halls, denies right flank pain' has been straining urine, but not passed stone yet. Afebrile  O; Vital Signs Last 24 Hrs  T(C): 36.6 (2025 11:22), Max: 36.7 (2025 14:00)  T(F): 97.8 (2025 11:22), Max: 98.1 (2025 14:00)  HR: 61 (2025 11:22) (61 - 67)  BP: 109/62 (2025 11:22) (109/62 - 144/81)  BP(mean): --  RR: 18 (2025 11:22) (18 - 18)  SpO2: 96% (2025 05:54) (96% - 100%)    MEDICATIONS  (STANDING):  chlorhexidine 2% Cloths 1 Application(s) Topical <User Schedule>  pantoprazole  Injectable 40 milliGRAM(s) IV Push every 12 hours  sodium chloride 0.9%. 1000 milliLiter(s) (80 mL/Hr) IV Continuous <Continuous>    MEDICATIONS  (PRN):  acetaminophen     Tablet .. 650 milliGRAM(s) Oral every 6 hours PRN Temp greater or equal to 38C (100.4F), Mild Pain (1 - 3)  aluminum hydroxide/magnesium hydroxide/simethicone Suspension 30 milliLiter(s) Oral every 4 hours PRN Dyspepsia  ondansetron Injectable 4 milliGRAM(s) IV Push every 8 hours PRN Nausea and/or Vomiting    EXAM:  GEN: well appearing  abd: soft, no RCVAT appreciated    Labs:  CAPILLARY BLOOD GLUCOSE                              9.2    6.14  )-----------( 209      ( 2025 19:36 )             26.9         06-12    138  |  102  |  12  ----------------------------<  85  4.2   |  26  |  0.9          LFTs:             6.7  | <0.2 | 16       ------------------[80      ( 2025 23:50 )  4.2  | x    | 11          Lipase:32     Amylase:x             Coags:        Urinalysis Basic - ( 2025 20:33 )    Color: Yellow / Appearance: Clear / S.013 / pH: x  Gluc: x / Ketone: x  / Bili: Negative / Urobili: 0.2 mg/dL   Blood: x / Protein: Negative mg/dL / Nitrite: Negative   Leuk Esterase: Trace / RBC: 10 /HPF / WBC 10 /HPF   Sq Epi: x / Non Sq Epi: x / Bacteria: Few /HPF        
CHIEF COMPLAINT:    Patient is a 49y old  Male who presents with a chief complaint of gib (12 Jun 2025 14:35)    INTERVAL HPI/OVERNIGHT EVENTS:    Patient seen and examined at bedside. Has been drinking golytely for colonoscopy today. There has been some blood with the stool.     Medications:  Standing  chlorhexidine 2% Cloths 1 Application(s) Topical <User Schedule>  pantoprazole  Injectable 40 milliGRAM(s) IV Push every 12 hours  sodium chloride 0.9%. 1000 milliLiter(s) IV Continuous <Continuous>    PRN Meds  acetaminophen     Tablet .. 650 milliGRAM(s) Oral every 6 hours PRN  aluminum hydroxide/magnesium hydroxide/simethicone Suspension 30 milliLiter(s) Oral every 4 hours PRN  ondansetron Injectable 4 milliGRAM(s) IV Push every 8 hours PRN    Vital Signs:    T(F): 97.8 (06-13-25 @ 05:54), Max: 98.1 (06-12-25 @ 14:00)  HR: 61 (06-13-25 @ 05:54) (61 - 67)  BP: 109/62 (06-13-25 @ 05:54) (109/62 - 144/81)  RR: 18 (06-13-25 @ 05:54) (18 - 18)  SpO2: 96% (06-13-25 @ 05:54) (96% - 100%)  I&O's Summary    Daily     Daily   CAPILLARY BLOOD GLUCOSE    PHYSICAL EXAM:  GENERAL:  NAD  SKIN: scattered bruises on left flank  HEENT: Atraumatic. Normocephalic. Anicteric  NECK:  No JVD.   PULMONARY: Clear to ausculation bilaterally. No wheezing. No rales  CVS: Normal S1, S2. Regular rate and rhythm. No murmurs.  ABDOMEN/GI: Soft, Nontender, Nondistended  EXTREMITIES:  No edema B/L LE.  NEUROLOGIC:  No motor deficit.  PSYCH: Alert & oriented x 3, normal affect    LABS:                        9.2    6.14  )-----------( 209      ( 12 Jun 2025 19:36 )             26.9     06-12    138  |  102  |  12  ----------------------------<  85  4.2   |  26  |  0.9    Ca    8.8      12 Jun 2025 11:23    TPro  6.7  /  Alb  4.2  /  TBili  <0.2  /  DBili  x   /  AST  16  /  ALT  11  /  AlkPhos  80  06-11      RADIOLOGY & ADDITIONAL TESTS:  Imaging or report Personally Reviewed:  [ ] YES  [ ] NO -->no new images

## 2025-06-19 LAB — SURGICAL PATHOLOGY STUDY: SIGNIFICANT CHANGE UP
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
## 2025-06-20 DIAGNOSIS — K63.5 POLYP OF COLON: ICD-10-CM

## 2025-06-20 DIAGNOSIS — K64.4 RESIDUAL HEMORRHOIDAL SKIN TAGS: ICD-10-CM

## 2025-06-20 DIAGNOSIS — K57.31 DIVERTICULOSIS OF LARGE INTESTINE WITHOUT PERFORATION OR ABSCESS WITH BLEEDING: ICD-10-CM

## 2025-06-20 DIAGNOSIS — N13.2 HYDRONEPHROSIS WITH RENAL AND URETERAL CALCULOUS OBSTRUCTION: ICD-10-CM

## 2025-06-20 DIAGNOSIS — F17.210 NICOTINE DEPENDENCE, CIGARETTES, UNCOMPLICATED: ICD-10-CM

## 2025-06-20 DIAGNOSIS — N13.4 HYDROURETER: ICD-10-CM

## 2025-06-20 DIAGNOSIS — S30.1XXA CONTUSION OF ABDOMINAL WALL, INITIAL ENCOUNTER: ICD-10-CM
